# Patient Record
Sex: MALE | Race: WHITE | Employment: FULL TIME | URBAN - METROPOLITAN AREA
[De-identification: names, ages, dates, MRNs, and addresses within clinical notes are randomized per-mention and may not be internally consistent; named-entity substitution may affect disease eponyms.]

---

## 2018-04-21 VITALS
TEMPERATURE: 98.2 F | WEIGHT: 221 LBS | SYSTOLIC BLOOD PRESSURE: 140 MMHG | RESPIRATION RATE: 16 BRPM | HEART RATE: 120 BPM | DIASTOLIC BLOOD PRESSURE: 92 MMHG | BODY MASS INDEX: 31.64 KG/M2 | HEIGHT: 70 IN

## 2018-04-21 RX ORDER — TADALAFIL 5 MG/1
5 TABLET ORAL PRN
COMMUNITY
End: 2018-11-09 | Stop reason: SDUPTHER

## 2018-08-18 DIAGNOSIS — E29.1 HYPOTESTOSTERONEMIA IN MALE: Primary | ICD-10-CM

## 2018-08-20 DIAGNOSIS — E34.9 HYPOTESTOSTERONEMIA: Primary | ICD-10-CM

## 2018-08-20 RX ORDER — TESTOSTERONE 16.2 MG/G
GEL TRANSDERMAL
Refills: 2 | OUTPATIENT
Start: 2018-08-20 | End: 2018-09-20

## 2018-09-28 DIAGNOSIS — E34.9 HYPOTESTOSTERONEMIA: ICD-10-CM

## 2018-10-02 LAB
SEX HORMONE BINDING GLOBULIN: 40 NMOL/L (ref 11–80)
TESTOSTERONE FREE-NONMALE: 49.5 PG/ML (ref 47–244)
TESTOSTERONE TOTAL: 285 NG/DL (ref 220–1000)

## 2018-10-16 RX ORDER — BUPROPION HYDROCHLORIDE 150 MG/1
TABLET, EXTENDED RELEASE ORAL
Qty: 180 TABLET | Refills: 0 | OUTPATIENT
Start: 2018-10-16

## 2018-11-07 RX ORDER — TADALAFIL 5 MG/1
TABLET ORAL
Qty: 30 TABLET | Refills: 4 | OUTPATIENT
Start: 2018-11-07

## 2018-11-09 ENCOUNTER — OFFICE VISIT (OUTPATIENT)
Dept: PRIMARY CARE CLINIC | Age: 58
End: 2018-11-09
Payer: COMMERCIAL

## 2018-11-09 VITALS
BODY MASS INDEX: 31.92 KG/M2 | RESPIRATION RATE: 16 BRPM | HEART RATE: 90 BPM | HEIGHT: 70 IN | DIASTOLIC BLOOD PRESSURE: 88 MMHG | WEIGHT: 223 LBS | SYSTOLIC BLOOD PRESSURE: 144 MMHG | TEMPERATURE: 97.9 F

## 2018-11-09 DIAGNOSIS — E66.09 CLASS 1 OBESITY DUE TO EXCESS CALORIES WITH SERIOUS COMORBIDITY AND BODY MASS INDEX (BMI) OF 32.0 TO 32.9 IN ADULT: ICD-10-CM

## 2018-11-09 DIAGNOSIS — I10 ESSENTIAL HYPERTENSION: Primary | ICD-10-CM

## 2018-11-09 DIAGNOSIS — Z12.5 PROSTATE CANCER SCREENING: ICD-10-CM

## 2018-11-09 DIAGNOSIS — E29.1 HYPOTESTOSTERONEMIA IN MALE: ICD-10-CM

## 2018-11-09 DIAGNOSIS — N52.9 ERECTILE DYSFUNCTION, UNSPECIFIED ERECTILE DYSFUNCTION TYPE: ICD-10-CM

## 2018-11-09 DIAGNOSIS — F33.0 MILD EPISODE OF RECURRENT MAJOR DEPRESSIVE DISORDER (HCC): ICD-10-CM

## 2018-11-09 PROBLEM — N52.8 OTHER MALE ERECTILE DYSFUNCTION: Status: ACTIVE | Noted: 2018-11-09

## 2018-11-09 PROCEDURE — 99214 OFFICE O/P EST MOD 30 MIN: CPT | Performed by: NURSE PRACTITIONER

## 2018-11-09 RX ORDER — TADALAFIL 5 MG/1
5 TABLET ORAL PRN
Qty: 30 TABLET | Refills: 5 | Status: SHIPPED | OUTPATIENT
Start: 2018-11-09 | End: 2019-06-20 | Stop reason: SDUPTHER

## 2018-11-09 RX ORDER — TESTOSTERONE 16.2 MG/G
4 GEL TRANSDERMAL DAILY
Qty: 2 BOTTLE | Refills: 5 | Status: SHIPPED | OUTPATIENT
Start: 2018-11-09 | End: 2019-06-20 | Stop reason: SDUPTHER

## 2018-11-09 RX ORDER — TESTOSTERONE 12.5 MG/1.25G
5 GEL TOPICAL DAILY
Qty: 3 PACKAGE | Refills: 1 | Status: CANCELLED | OUTPATIENT
Start: 2018-11-09

## 2018-11-09 RX ORDER — BUPROPION HYDROCHLORIDE 150 MG/1
150 TABLET, EXTENDED RELEASE ORAL 2 TIMES DAILY
Qty: 180 TABLET | Refills: 1 | Status: SHIPPED | OUTPATIENT
Start: 2018-11-09 | End: 2019-06-20 | Stop reason: SDUPTHER

## 2018-11-09 RX ORDER — LISINOPRIL AND HYDROCHLOROTHIAZIDE 20; 12.5 MG/1; MG/1
1 TABLET ORAL DAILY
Qty: 90 TABLET | Refills: 1 | Status: SHIPPED | OUTPATIENT
Start: 2018-11-09 | End: 2019-06-20 | Stop reason: SDUPTHER

## 2018-11-09 RX ORDER — LISINOPRIL AND HYDROCHLOROTHIAZIDE 12.5; 1 MG/1; MG/1
1 TABLET ORAL DAILY
Qty: 90 TABLET | Refills: 1 | Status: CANCELLED | OUTPATIENT
Start: 2018-11-09

## 2018-11-09 ASSESSMENT — PATIENT HEALTH QUESTIONNAIRE - PHQ9
2. FEELING DOWN, DEPRESSED OR HOPELESS: 0
1. LITTLE INTEREST OR PLEASURE IN DOING THINGS: 0
SUM OF ALL RESPONSES TO PHQ QUESTIONS 1-9: 0
SUM OF ALL RESPONSES TO PHQ9 QUESTIONS 1 & 2: 0
SUM OF ALL RESPONSES TO PHQ QUESTIONS 1-9: 0

## 2018-11-09 ASSESSMENT — ENCOUNTER SYMPTOMS
ABDOMINAL DISTENTION: 0
TROUBLE SWALLOWING: 0
COUGH: 0
SHORTNESS OF BREATH: 0
NAUSEA: 0
ABDOMINAL PAIN: 0
DIARRHEA: 0
CONSTIPATION: 0
CHEST TIGHTNESS: 0

## 2018-11-09 NOTE — PROGRESS NOTES
Other Topics Concern    Not on file     Social History Narrative    No narrative on file     Family History   Problem Relation Age of Onset    Mental Illness Other      Patient Active Problem List   Diagnosis    Hypotestosteronemia    Essential hypertension    Class 1 obesity due to excess calories with serious comorbidity and body mass index (BMI) of 32.0 to 32.9 in adult    Mild episode of recurrent major depressive disorder ([facility] [address]    Other male erectile dysfunction        LABS:  Orders Only on 09/28/2018   Component Date Value Ref Range Status    Testosterone 09/28/2018 285  220 - 1,000 ng/dL Final    Sex Hormone Binding 09/28/2018 40  11 - 80 nmol/L Final    Testosterone, Free 09/28/2018 49.5  47 - 244 pg/mL Final    Comment:  The concentration of free testosterone is derived from a mathematical  expression based on the constant for the binding of testosterone to  albumin and/or sex hormone binding globulin. [address] [place] [address] [phone]            PHYSICAL EXAM  BP (!) 144/88 (Site: Right Upper Arm, Position: Sitting)   Pulse 90   Temp 97.9 °F (36.6 °C) (Oral)   Resp 16   Ht 5' 10\" (1.778 m)   Wt 223 lb (101.2 kg)   BMI 32.00 kg/m²     BP Readings from Last 3 Encounters:   11/09/18 (!) 144/88   04/13/18 (!) 140/92   09/27/16 (!) 138/98       Wt Readings from Last 3 Encounters:   11/09/18 223 lb (101.2 kg)   04/13/18 221 lb (100.2 kg)   09/27/16 222 lb (100.7 kg)        Physical Exam   Constitutional: He is oriented to person, place, and time. He appears well-developed and well-nourished. HENT:   Head: Normocephalic. Right Ear: External ear normal.   Left Ear: External ear normal.   Nose: Nose normal.   Mouth/Throat: Oropharynx is clear and moist.   Eyes: Pupils are equal, round, and reactive to light. Conjunctivae and EOM are normal.   Neck: Normal range of motion. Neck supple. Carotid bruit is not present. No thyromegaly present.    Cardiovascular:

## 2019-06-18 DIAGNOSIS — N52.9 ERECTILE DYSFUNCTION, UNSPECIFIED ERECTILE DYSFUNCTION TYPE: ICD-10-CM

## 2019-06-18 DIAGNOSIS — I10 ESSENTIAL HYPERTENSION: ICD-10-CM

## 2019-06-18 DIAGNOSIS — E29.1 HYPOTESTOSTERONEMIA IN MALE: ICD-10-CM

## 2019-06-18 DIAGNOSIS — Z12.5 PROSTATE CANCER SCREENING: ICD-10-CM

## 2019-06-18 LAB
A/G RATIO: 2.2 (ref 1.1–2.2)
ALBUMIN SERPL-MCNC: 4.6 G/DL (ref 3.4–5)
ALP BLD-CCNC: 58 U/L (ref 40–129)
ALT SERPL-CCNC: 20 U/L (ref 10–40)
ANION GAP SERPL CALCULATED.3IONS-SCNC: 12 MMOL/L (ref 3–16)
AST SERPL-CCNC: 15 U/L (ref 15–37)
BASOPHILS ABSOLUTE: 0 K/UL (ref 0–0.2)
BASOPHILS RELATIVE PERCENT: 0.5 %
BILIRUB SERPL-MCNC: 0.5 MG/DL (ref 0–1)
BUN BLDV-MCNC: 13 MG/DL (ref 7–20)
CALCIUM SERPL-MCNC: 9.7 MG/DL (ref 8.3–10.6)
CHLORIDE BLD-SCNC: 98 MMOL/L (ref 99–110)
CHOLESTEROL, FASTING: 182 MG/DL (ref 0–199)
CO2: 29 MMOL/L (ref 21–32)
CREAT SERPL-MCNC: 1.1 MG/DL (ref 0.9–1.3)
EOSINOPHILS ABSOLUTE: 0.1 K/UL (ref 0–0.6)
EOSINOPHILS RELATIVE PERCENT: 1.8 %
GFR AFRICAN AMERICAN: >60
GFR NON-AFRICAN AMERICAN: >60
GLOBULIN: 2.1 G/DL
GLUCOSE BLD-MCNC: 107 MG/DL (ref 70–99)
HCT VFR BLD CALC: 43.8 % (ref 40.5–52.5)
HDLC SERPL-MCNC: 63 MG/DL (ref 40–60)
HEMOGLOBIN: 14.7 G/DL (ref 13.5–17.5)
LDL CHOLESTEROL CALCULATED: 92 MG/DL
LYMPHOCYTES ABSOLUTE: 1.6 K/UL (ref 1–5.1)
LYMPHOCYTES RELATIVE PERCENT: 26 %
MCH RBC QN AUTO: 29.9 PG (ref 26–34)
MCHC RBC AUTO-ENTMCNC: 33.5 G/DL (ref 31–36)
MCV RBC AUTO: 89.3 FL (ref 80–100)
MONOCYTES ABSOLUTE: 0.5 K/UL (ref 0–1.3)
MONOCYTES RELATIVE PERCENT: 9 %
NEUTROPHILS ABSOLUTE: 3.8 K/UL (ref 1.7–7.7)
NEUTROPHILS RELATIVE PERCENT: 62.7 %
PDW BLD-RTO: 13.2 % (ref 12.4–15.4)
PLATELET # BLD: 298 K/UL (ref 135–450)
PMV BLD AUTO: 8.5 FL (ref 5–10.5)
POTASSIUM SERPL-SCNC: 4.6 MMOL/L (ref 3.5–5.1)
PROSTATE SPECIFIC ANTIGEN: 1.29 NG/ML (ref 0–4)
RBC # BLD: 4.9 M/UL (ref 4.2–5.9)
SODIUM BLD-SCNC: 139 MMOL/L (ref 136–145)
T4 FREE: 1.5 NG/DL (ref 0.9–1.8)
TOTAL PROTEIN: 6.7 G/DL (ref 6.4–8.2)
TRIGLYCERIDE, FASTING: 136 MG/DL (ref 0–150)
TSH SERPL DL<=0.05 MIU/L-ACNC: 1.46 UIU/ML (ref 0.27–4.2)
VLDLC SERPL CALC-MCNC: 27 MG/DL
WBC # BLD: 6 K/UL (ref 4–11)

## 2019-06-20 ENCOUNTER — OFFICE VISIT (OUTPATIENT)
Dept: PRIMARY CARE CLINIC | Age: 59
End: 2019-06-20
Payer: COMMERCIAL

## 2019-06-20 VITALS
BODY MASS INDEX: 31.85 KG/M2 | SYSTOLIC BLOOD PRESSURE: 134 MMHG | WEIGHT: 222 LBS | RESPIRATION RATE: 15 BRPM | OXYGEN SATURATION: 98 % | TEMPERATURE: 97.7 F | DIASTOLIC BLOOD PRESSURE: 78 MMHG | HEART RATE: 90 BPM

## 2019-06-20 DIAGNOSIS — D17.1 LIPOMA OF TORSO: ICD-10-CM

## 2019-06-20 DIAGNOSIS — I10 ESSENTIAL HYPERTENSION: Primary | ICD-10-CM

## 2019-06-20 DIAGNOSIS — E29.1 HYPOTESTOSTERONEMIA IN MALE: ICD-10-CM

## 2019-06-20 DIAGNOSIS — K43.9 VENTRAL HERNIA WITHOUT OBSTRUCTION OR GANGRENE: ICD-10-CM

## 2019-06-20 DIAGNOSIS — E66.09 CLASS 1 OBESITY DUE TO EXCESS CALORIES WITH SERIOUS COMORBIDITY AND BODY MASS INDEX (BMI) OF 32.0 TO 32.9 IN ADULT: ICD-10-CM

## 2019-06-20 DIAGNOSIS — R73.01 IFG (IMPAIRED FASTING GLUCOSE): ICD-10-CM

## 2019-06-20 DIAGNOSIS — N52.9 ERECTILE DYSFUNCTION, UNSPECIFIED ERECTILE DYSFUNCTION TYPE: ICD-10-CM

## 2019-06-20 DIAGNOSIS — F33.0 MILD EPISODE OF RECURRENT MAJOR DEPRESSIVE DISORDER (HCC): ICD-10-CM

## 2019-06-20 LAB
BILIRUBIN, POC: NORMAL
BLOOD URINE, POC: NORMAL
CLARITY, POC: CLEAR
COLOR, POC: YELLOW
GLUCOSE URINE, POC: NORMAL
KETONES, POC: NORMAL
LEUKOCYTE EST, POC: NORMAL
NITRITE, POC: NORMAL
PH, POC: 5.5
PROTEIN, POC: NORMAL
SEX HORMONE BINDING GLOBULIN: 50 NMOL/L (ref 11–80)
SPECIFIC GRAVITY, POC: 1.02
TESTOSTERONE FREE-NONMALE: 50.5 PG/ML (ref 47–244)
TESTOSTERONE TOTAL: 333 NG/DL (ref 220–1000)
UROBILINOGEN, POC: 0.2

## 2019-06-20 PROCEDURE — 81002 URINALYSIS NONAUTO W/O SCOPE: CPT | Performed by: NURSE PRACTITIONER

## 2019-06-20 PROCEDURE — 93000 ELECTROCARDIOGRAM COMPLETE: CPT | Performed by: NURSE PRACTITIONER

## 2019-06-20 PROCEDURE — 99214 OFFICE O/P EST MOD 30 MIN: CPT | Performed by: NURSE PRACTITIONER

## 2019-06-20 RX ORDER — LISINOPRIL AND HYDROCHLOROTHIAZIDE 20; 12.5 MG/1; MG/1
1 TABLET ORAL DAILY
Qty: 90 TABLET | Refills: 1 | Status: SHIPPED | OUTPATIENT
Start: 2019-06-20 | End: 2020-03-20 | Stop reason: SDUPTHER

## 2019-06-20 RX ORDER — BUPROPION HYDROCHLORIDE 150 MG/1
150 TABLET, EXTENDED RELEASE ORAL 2 TIMES DAILY
Qty: 180 TABLET | Refills: 1 | Status: CANCELLED | OUTPATIENT
Start: 2019-06-20

## 2019-06-20 RX ORDER — BUPROPION HYDROCHLORIDE 150 MG/1
300 TABLET ORAL EVERY MORNING
Qty: 180 TABLET | Refills: 1 | Status: SHIPPED | OUTPATIENT
Start: 2019-06-20 | End: 2020-03-20 | Stop reason: SDUPTHER

## 2019-06-20 RX ORDER — TESTOSTERONE 16.2 MG/G
4 GEL TRANSDERMAL DAILY
Qty: 2 BOTTLE | Refills: 5 | Status: SHIPPED | OUTPATIENT
Start: 2019-06-20 | End: 2020-03-20 | Stop reason: SDUPTHER

## 2019-06-20 RX ORDER — TADALAFIL 5 MG/1
5 TABLET ORAL PRN
Qty: 30 TABLET | Refills: 5 | Status: SHIPPED | OUTPATIENT
Start: 2019-06-20 | End: 2019-07-03 | Stop reason: SDUPTHER

## 2019-06-20 ASSESSMENT — ENCOUNTER SYMPTOMS
ABDOMINAL PAIN: 0
ABDOMINAL DISTENTION: 0
COUGH: 0
CHEST TIGHTNESS: 0
TROUBLE SWALLOWING: 0
SHORTNESS OF BREATH: 0
NAUSEA: 0
CONSTIPATION: 0
DIARRHEA: 0

## 2019-06-20 NOTE — PROGRESS NOTES
6/20/2019    Chief Complaint   Patient presents with    Hypertension     labs done 06/18/19, need refill, pt been checking b/p at home, aound 120//80    Hyperlipidemia     labs done 06/18/19, need refill.  Depression       HPI:  Suzi Jett is here for follow up with HTN, Depression, Hypotestosteronemia and Hyperlipidemia. He has been monitoring blood pressure at home and noting to be ranging 120-130's/80's. Denies chest pain, dizziness, headaches, fatigue, low libido or fatigue. He is concerned with area to left upper back which has been presnet for years but seems to be enlarging and can be uncomfortable when lying flat. He states that is noting bulging upper abdomen where hernia repair was performed and noting intermittent bruisng to abdomen after exercise, sit ups , push ups. No lifting weights. without trauma. Review of Systems   Constitutional: Negative for chills, fatigue and fever. HENT: Negative for trouble swallowing. Eyes: Negative for visual disturbance. Respiratory: Negative for cough, chest tightness and shortness of breath. Cardiovascular: Negative for chest pain, palpitations and leg swelling. Gastrointestinal: Negative for abdominal distention, abdominal pain, constipation, diarrhea and nausea. Endocrine: Negative for cold intolerance, heat intolerance, polydipsia, polyphagia and polyuria. Genitourinary: Negative for difficulty urinating, frequency and urgency. Musculoskeletal: Negative. Skin: Negative. Neurological: Negative for dizziness, weakness, light-headedness and headaches. Psychiatric/Behavioral: Negative for decreased concentration, dysphoric mood and sleep disturbance. The patient is not nervous/anxious. No Known Allergies  Prior to Visit Medications    Medication Sig Taking?  Authorizing Provider   tadalafil (CIALIS) 5 MG tablet Take 1 tablet by mouth as needed for Erectile Dysfunction Yes CT Garza - CNP   lisinopril-hydrochlorothiazide (PRINZIDE;ZESTORETIC) 20-12.5 MG per tablet Take 1 tablet by mouth daily Yes Kelly Hernandeze, APRN - CNP   Testosterone (ANDROGEL) 20.25 MG/ACT (1.62%) GEL gel Place 4 actuation onto the skin daily for 90 days. Yes Kelly Hernandeze, APRN - CNP   buPROPion (WELLBUTRIN XL) 150 MG extended release tablet Take 2 tablets by mouth every morning Yes Kelly Jennifer, APRN - CNP     Current Outpatient Medications   Medication Sig Dispense Refill    tadalafil (CIALIS) 5 MG tablet Take 1 tablet by mouth as needed for Erectile Dysfunction 30 tablet 5    lisinopril-hydrochlorothiazide (PRINZIDE;ZESTORETIC) 20-12.5 MG per tablet Take 1 tablet by mouth daily 90 tablet 1    Testosterone (ANDROGEL) 20.25 MG/ACT (1.62%) GEL gel Place 4 actuation onto the skin daily for 90 days. 2 Bottle 5    buPROPion (WELLBUTRIN XL) 150 MG extended release tablet Take 2 tablets by mouth every morning 180 tablet 1     No current facility-administered medications for this visit.       Health Maintenance   Topic Date Due    Hepatitis C screen  1960    A1C test (Diabetic or Prediabetic)  09/18/1969    HIV screen  09/18/1974    Shingles Vaccine (1 of 2) 09/18/2009    Colon cancer screen colonoscopy  09/18/2009    Flu vaccine (Season Ended) 09/01/2019    Potassium monitoring  06/18/2020    Creatinine monitoring  06/18/2020    DTaP/Tdap/Td vaccine (2 - Td) 06/27/2022    Lipid screen  06/18/2024    Pneumococcal 0-64 years Vaccine  Aged Out      Social History     Socioeconomic History    Marital status: Legally      Spouse name: None    Number of children: None    Years of education: None    Highest education level: None   Occupational History    None   Social Needs    Financial resource strain: None    Food insecurity:     Worry: None     Inability: None    Transportation needs:     Medical: None     Non-medical: None   Tobacco Use    Smoking status: Former Smoker     Packs/day: 1.00     Years: 15.00     Pack years: 15.00 Types: Cigarettes     Last attempt to quit: 1993     Years since quittin.6    Smokeless tobacco: Never Used   Substance and Sexual Activity    Alcohol use: Yes     Comment: very rare    Drug use: No    Sexual activity: None   Lifestyle    Physical activity:     Days per week: None     Minutes per session: None    Stress: None   Relationships    Social connections:     Talks on phone: None     Gets together: None     Attends Jain service: None     Active member of club or organization: None     Attends meetings of clubs or organizations: None     Relationship status: None    Intimate partner violence:     Fear of current or ex partner: None     Emotionally abused: None     Physically abused: None     Forced sexual activity: None   Other Topics Concern    None   Social History Narrative    None     Family History   Problem Relation Age of Onset    Mental Illness Other      Patient Active Problem List   Diagnosis    Hypotestosteronemia    Essential hypertension    Class 1 obesity due to excess calories with serious comorbidity and body mass index (BMI) of 32.0 to 32.9 in adult    Mild episode of recurrent major depressive disorder ([facility] [address]    Other male erectile dysfunction    IFG (impaired fasting glucose)        LABS:  Orders Only on 2019   Component Date Value Ref Range Status    PSA 2019 1.29  0.00 - 4.00 ng/mL Final    Sodium 2019 139  136 - 145 mmol/L Final    Potassium 2019 4.6  3.5 - 5.1 mmol/L Final    Chloride 2019 98* 99 - 110 mmol/L Final    CO2 2019 29  21 - 32 mmol/L Final    Anion Gap 2019 12  3 - 16 Final    Glucose 2019 107* 70 - 99 mg/dL Final    BUN 2019 13  7 - 20 mg/dL Final    CREATININE 2019 1.1  0.9 - 1.3 mg/dL Final    GFR Non- 2019 >60  >60 Final    Comment: >60 mL/min/1.73m2 EGFR, calc. for ages 25 and older using the  MDRD formula (not corrected for weight), is valid for stable  renal function.  GFR  06/18/2019 >60  >60 Final    Comment: Chronic Kidney Disease: less than 60 ml/min/1.73 sq.m. Kidney Failure: less than 15 ml/min/1.73 sq.m. Results valid for patients 18 years and older.       Calcium 06/18/2019 9.7  8.3 - 10.6 mg/dL Final    Total Protein 06/18/2019 6.7  6.4 - 8.2 g/dL Final    Alb 06/18/2019 4.6  3.4 - 5.0 g/dL Final    Albumin/Globulin Ratio 06/18/2019 2.2  1.1 - 2.2 Final    Total Bilirubin 06/18/2019 0.5  0.0 - 1.0 mg/dL Final    Alkaline Phosphatase 06/18/2019 58  40 - 129 U/L Final    ALT 06/18/2019 20  10 - 40 U/L Final    AST 06/18/2019 15  15 - 37 U/L Final    Globulin 06/18/2019 2.1  g/dL Final    WBC 06/18/2019 6.0  4.0 - 11.0 K/uL Final    RBC 06/18/2019 4.90  4.20 - 5.90 M/uL Final    Hemoglobin 06/18/2019 14.7  13.5 - 17.5 g/dL Final    Hematocrit 06/18/2019 43.8  40.5 - 52.5 % Final    MCV 06/18/2019 89.3  80.0 - 100.0 fL Final    MCH 06/18/2019 29.9  26.0 - 34.0 pg Final    MCHC 06/18/2019 33.5  31.0 - 36.0 g/dL Final    RDW 06/18/2019 13.2  12.4 - 15.4 % Final    Platelets 45/01/1197 298  135 - 450 K/uL Final    MPV 06/18/2019 8.5  5.0 - 10.5 fL Final    Neutrophils % 06/18/2019 62.7  % Final    Lymphocytes % 06/18/2019 26.0  % Final    Monocytes % 06/18/2019 9.0  % Final    Eosinophils % 06/18/2019 1.8  % Final    Basophils % 06/18/2019 0.5  % Final    Neutrophils # 06/18/2019 3.8  1.7 - 7.7 K/uL Final    Lymphocytes # 06/18/2019 1.6  1.0 - 5.1 K/uL Final    Monocytes # 06/18/2019 0.5  0.0 - 1.3 K/uL Final    Eosinophils # 06/18/2019 0.1  0.0 - 0.6 K/uL Final    Basophils # 06/18/2019 0.0  0.0 - 0.2 K/uL Final    Cholesterol, Fasting 06/18/2019 182  0 - 199 mg/dL Final    Triglyceride, Fasting 06/18/2019 136  0 - 150 mg/dL Final    HDL 06/18/2019 63* 40 - 60 mg/dL Final    LDL Calculated 06/18/2019 92  <100 mg/dL Final    VLDL Cholesterol Calculated 06/18/2019 27  Not Established mg/dL Final    T4 Free 06/18/2019 1.5  0.9 - 1.8 ng/dL Final    TSH 06/18/2019 1.46  0.27 - 4.20 uIU/mL Final          PHYSICAL EXAM  /78 (Site: Right Upper Arm, Position: Sitting, Cuff Size: Medium Adult)   Pulse 90   Temp 97.7 °F (36.5 °C) (Oral)   Resp 15   Wt 222 lb (100.7 kg)   SpO2 98%   BMI 31.85 kg/m²     BP Readings from Last 3 Encounters:   06/20/19 134/78   11/09/18 (!) 144/88   04/13/18 (!) 140/92       Wt Readings from Last 3 Encounters:   06/20/19 222 lb (100.7 kg)   11/09/18 223 lb (101.2 kg)   04/13/18 221 lb (100.2 kg)        Physical Exam   Constitutional: He is oriented to person, place, and time. He appears well-developed and well-nourished. HENT:   Head: Normocephalic. Right Ear: Tympanic membrane, external ear and ear canal normal.   Left Ear: Tympanic membrane, external ear and ear canal normal.   Nose: Nose normal.   Mouth/Throat: Uvula is midline, oropharynx is clear and moist and mucous membranes are normal.   Eyes: Pupils are equal, round, and reactive to light. Conjunctivae, EOM and lids are normal. No scleral icterus. Neck: Normal range of motion. Neck supple. Carotid bruit is not present. No thyromegaly present. Cardiovascular: Normal rate, regular rhythm, S1 normal, S2 normal, normal heart sounds and intact distal pulses. No murmur heard. Pulmonary/Chest: Effort normal and breath sounds normal. No respiratory distress. He has no wheezes. He has no rales. Abdominal: Soft. Normal appearance and bowel sounds are normal. He exhibits no distension and no mass. There is no hepatosplenomegaly. There is no tenderness. There is no rebound and no guarding. A hernia is present. Hernia confirmed positive in the ventral area. Genitourinary: Rectum normal, prostate normal and penis normal. Rectal exam shows guaiac negative stool. No penile tenderness. Musculoskeletal: Normal range of motion. He exhibits no edema, tenderness or deformity.    Lymphadenopathy:     He has

## 2019-07-03 DIAGNOSIS — N52.9 ERECTILE DYSFUNCTION, UNSPECIFIED ERECTILE DYSFUNCTION TYPE: ICD-10-CM

## 2019-07-03 RX ORDER — TADALAFIL 5 MG/1
TABLET ORAL
Qty: 30 TABLET | Refills: 0 | Status: SHIPPED | OUTPATIENT
Start: 2019-07-03 | End: 2019-08-06 | Stop reason: SDUPTHER

## 2019-08-06 DIAGNOSIS — N52.9 ERECTILE DYSFUNCTION, UNSPECIFIED ERECTILE DYSFUNCTION TYPE: ICD-10-CM

## 2019-08-06 RX ORDER — TADALAFIL 5 MG/1
TABLET ORAL
Qty: 30 TABLET | Refills: 0 | Status: SHIPPED | OUTPATIENT
Start: 2019-08-06 | End: 2019-09-02 | Stop reason: SDUPTHER

## 2019-08-09 ENCOUNTER — OFFICE VISIT (OUTPATIENT)
Dept: SURGERY | Age: 59
End: 2019-08-09
Payer: COMMERCIAL

## 2019-08-09 VITALS
DIASTOLIC BLOOD PRESSURE: 82 MMHG | SYSTOLIC BLOOD PRESSURE: 130 MMHG | HEIGHT: 70 IN | BODY MASS INDEX: 32.21 KG/M2 | WEIGHT: 225 LBS

## 2019-08-09 DIAGNOSIS — D17.1 LIPOMA OF TORSO: Primary | ICD-10-CM

## 2019-08-09 PROCEDURE — 99242 OFF/OP CONSLTJ NEW/EST SF 20: CPT | Performed by: SURGERY

## 2019-08-09 ASSESSMENT — ENCOUNTER SYMPTOMS
EYES NEGATIVE: 1
RESPIRATORY NEGATIVE: 1
ALLERGIC/IMMUNOLOGIC NEGATIVE: 1
ABDOMINAL PAIN: 1
NAUSEA: 1

## 2019-08-09 NOTE — PROGRESS NOTES
no edema. Neurological: He is alert and oriented to person, place, and time. Skin: Skin is warm and dry. Psychiatric: He has a normal mood and affect. His behavior is normal.   10 x 8 SQ mass of the R upper back    :      60year old male who presents for evaluation of a subcutaneous mass of the R upper back. The mass has been present for several years but has recently started to become tender. Physical examination reveals and approximately 10 X 8 cm subcutaneous mass of the R upper back which is consistent with a lipoma. Plan:      Excision of lipoma of the R upper back.

## 2019-08-20 NOTE — PROGRESS NOTES
makeup) or nail polish on your fingers or toes. 10. DO NOT wear any jewelry or piercings on day of surgery. All body piercing jewelry must be removed. 11. If you have ___dentures, they will be removed before going to the OR; we will provide you a container. If you wear ___contact lenses or ___glasses, they will be removed; please bring a case for them. 12. Please see your family doctor/pediatrician for a history & physical and/or concerning medications. Bring any test results/reports from your physician's office. PCP__________________Phone___________H&P Appt. Date________             13 If you  have a Living Will and Durable Power of  for Healthcare, please bring in a copy. 15. Notify your Surgeon if you develop any illness between now and surgery  time, cough, cold, fever, sore throat, nausea, vomiting, etc.  Please notify your surgeon if you experience dizziness, shortness of breath or blurred vision between now & the time of your surgery             15. DO NOT shave your operative site 96 hours prior to surgery. For face & neck surgery, men may use an electric razor 48 hours prior to surgery. 16. Shower the DAY OF surgery with _X__Antibacterial soap ___Hibiclens             17. To provide excellent care visitors will be limited to one in the room at any given time. 18.  Please bring picture ID and insurance card. 19.  Visit our web site for additional information:  [email or web address]              20.During flu season no children under the age of 15 are permitted in the hospital for the safety of all patients.                               21. If you take a long acting insulin in the evening only  take half of your usual  dose the night  before your procedure              22. If you use a c-pap please bring DOS if staying overnight,             23.For your convenience [facility] has a pharmacy on site to fill your

## 2019-08-22 RX ORDER — BUPRENORPHINE AND NALOXONE 8; 2 MG/1; MG/1
1 FILM, SOLUBLE BUCCAL; SUBLINGUAL DAILY
COMMUNITY
End: 2021-02-17 | Stop reason: ALTCHOICE

## 2019-08-23 ENCOUNTER — HOSPITAL ENCOUNTER (OUTPATIENT)
Age: 59
Setting detail: OUTPATIENT SURGERY
Discharge: HOME | End: 2019-08-23
Attending: SURGERY | Admitting: SURGERY
Payer: COMMERCIAL

## 2019-08-23 ENCOUNTER — ANESTHESIA EVENT (OUTPATIENT)
Dept: OPERATING ROOM | Age: 59
End: 2019-08-23
Payer: COMMERCIAL

## 2019-08-23 ENCOUNTER — ANESTHESIA (OUTPATIENT)
Dept: OPERATING ROOM | Age: 59
End: 2019-08-23
Payer: COMMERCIAL

## 2019-08-23 VITALS
HEART RATE: 92 BPM | WEIGHT: 225 LBS | OXYGEN SATURATION: 99 % | DIASTOLIC BLOOD PRESSURE: 79 MMHG | TEMPERATURE: 97.4 F | HEIGHT: 70 IN | RESPIRATION RATE: 18 BRPM | SYSTOLIC BLOOD PRESSURE: 130 MMHG | BODY MASS INDEX: 32.21 KG/M2

## 2019-08-23 VITALS — SYSTOLIC BLOOD PRESSURE: 106 MMHG | OXYGEN SATURATION: 98 % | DIASTOLIC BLOOD PRESSURE: 70 MMHG

## 2019-08-23 LAB
ANION GAP SERPL CALCULATED.3IONS-SCNC: 11 MMOL/L (ref 3–16)
BUN BLDV-MCNC: 14 MG/DL (ref 7–20)
CALCIUM SERPL-MCNC: 9.2 MG/DL (ref 8.3–10.6)
CHLORIDE BLD-SCNC: 101 MMOL/L (ref 99–110)
CO2: 29 MMOL/L (ref 21–32)
CREAT SERPL-MCNC: 1.1 MG/DL (ref 0.9–1.3)
GFR AFRICAN AMERICAN: >60
GFR NON-AFRICAN AMERICAN: >60
GLUCOSE BLD-MCNC: 92 MG/DL (ref 70–99)
POTASSIUM SERPL-SCNC: 4.2 MMOL/L (ref 3.5–5.1)
SODIUM BLD-SCNC: 141 MMOL/L (ref 136–145)

## 2019-08-23 PROCEDURE — 88304 TISSUE EXAM BY PATHOLOGIST: CPT

## 2019-08-23 PROCEDURE — 3600000012 HC SURGERY LEVEL 2 ADDTL 15MIN: Performed by: SURGERY

## 2019-08-23 PROCEDURE — 3700000001 HC ADD 15 MINUTES (ANESTHESIA): Performed by: SURGERY

## 2019-08-23 PROCEDURE — 2500000003 HC RX 250 WO HCPCS: Performed by: SURGERY

## 2019-08-23 PROCEDURE — 80048 BASIC METABOLIC PNL TOTAL CA: CPT

## 2019-08-23 PROCEDURE — 2580000003 HC RX 258: Performed by: SURGERY

## 2019-08-23 PROCEDURE — 2500000003 HC RX 250 WO HCPCS: Performed by: NURSE ANESTHETIST, CERTIFIED REGISTERED

## 2019-08-23 PROCEDURE — 3700000000 HC ANESTHESIA ATTENDED CARE: Performed by: SURGERY

## 2019-08-23 PROCEDURE — 21931 EXC BACK LES SC 3 CM/>: CPT | Performed by: SURGERY

## 2019-08-23 PROCEDURE — 7100000000 HC PACU RECOVERY - FIRST 15 MIN: Performed by: SURGERY

## 2019-08-23 PROCEDURE — 7100000010 HC PHASE II RECOVERY - FIRST 15 MIN: Performed by: SURGERY

## 2019-08-23 PROCEDURE — 6360000002 HC RX W HCPCS: Performed by: NURSE ANESTHETIST, CERTIFIED REGISTERED

## 2019-08-23 PROCEDURE — 7100000001 HC PACU RECOVERY - ADDTL 15 MIN: Performed by: SURGERY

## 2019-08-23 PROCEDURE — 2709999900 HC NON-CHARGEABLE SUPPLY: Performed by: SURGERY

## 2019-08-23 PROCEDURE — 36415 COLL VENOUS BLD VENIPUNCTURE: CPT

## 2019-08-23 PROCEDURE — 7100000011 HC PHASE II RECOVERY - ADDTL 15 MIN: Performed by: SURGERY

## 2019-08-23 PROCEDURE — 6360000002 HC RX W HCPCS

## 2019-08-23 PROCEDURE — 3600000002 HC SURGERY LEVEL 2 BASE: Performed by: SURGERY

## 2019-08-23 PROCEDURE — 2580000003 HC RX 258: Performed by: NURSE ANESTHETIST, CERTIFIED REGISTERED

## 2019-08-23 RX ORDER — MIDAZOLAM HYDROCHLORIDE 1 MG/ML
INJECTION INTRAMUSCULAR; INTRAVENOUS PRN
Status: DISCONTINUED | OUTPATIENT
Start: 2019-08-23 | End: 2019-08-23 | Stop reason: SDUPTHER

## 2019-08-23 RX ORDER — PROPOFOL 10 MG/ML
INJECTION, EMULSION INTRAVENOUS PRN
Status: DISCONTINUED | OUTPATIENT
Start: 2019-08-23 | End: 2019-08-23 | Stop reason: SDUPTHER

## 2019-08-23 RX ORDER — PROPOFOL 10 MG/ML
INJECTION, EMULSION INTRAVENOUS CONTINUOUS PRN
Status: DISCONTINUED | OUTPATIENT
Start: 2019-08-23 | End: 2019-08-23 | Stop reason: SDUPTHER

## 2019-08-23 RX ORDER — SODIUM CHLORIDE, SODIUM LACTATE, POTASSIUM CHLORIDE, CALCIUM CHLORIDE 600; 310; 30; 20 MG/100ML; MG/100ML; MG/100ML; MG/100ML
INJECTION, SOLUTION INTRAVENOUS CONTINUOUS PRN
Status: DISCONTINUED | OUTPATIENT
Start: 2019-08-23 | End: 2019-08-23 | Stop reason: SDUPTHER

## 2019-08-23 RX ORDER — CEFAZOLIN SODIUM 2 G/100ML
2 INJECTION, SOLUTION INTRAVENOUS ONCE
Status: COMPLETED | OUTPATIENT
Start: 2019-08-23 | End: 2019-08-23

## 2019-08-23 RX ORDER — KETAMINE HCL IN NACL, ISO-OSM 100MG/10ML
SYRINGE (ML) INJECTION PRN
Status: DISCONTINUED | OUTPATIENT
Start: 2019-08-23 | End: 2019-08-23 | Stop reason: SDUPTHER

## 2019-08-23 RX ORDER — LIDOCAINE HYDROCHLORIDE 10 MG/ML
INJECTION, SOLUTION INFILTRATION; PERINEURAL
Status: COMPLETED | OUTPATIENT
Start: 2019-08-23 | End: 2019-08-23

## 2019-08-23 RX ORDER — CEFAZOLIN SODIUM 2 G/100ML
INJECTION, SOLUTION INTRAVENOUS
Status: COMPLETED
Start: 2019-08-23 | End: 2019-08-23

## 2019-08-23 RX ORDER — ONDANSETRON 2 MG/ML
INJECTION INTRAMUSCULAR; INTRAVENOUS PRN
Status: DISCONTINUED | OUTPATIENT
Start: 2019-08-23 | End: 2019-08-23 | Stop reason: SDUPTHER

## 2019-08-23 RX ORDER — LIDOCAINE HYDROCHLORIDE 20 MG/ML
INJECTION, SOLUTION EPIDURAL; INFILTRATION; INTRACAUDAL; PERINEURAL PRN
Status: DISCONTINUED | OUTPATIENT
Start: 2019-08-23 | End: 2019-08-23 | Stop reason: SDUPTHER

## 2019-08-23 RX ORDER — MAGNESIUM HYDROXIDE 1200 MG/15ML
LIQUID ORAL CONTINUOUS PRN
Status: COMPLETED | OUTPATIENT
Start: 2019-08-23 | End: 2019-08-23

## 2019-08-23 RX ADMIN — ONDANSETRON 4 MG: 2 INJECTION INTRAMUSCULAR; INTRAVENOUS at 14:30

## 2019-08-23 RX ADMIN — Medication 30 MG: at 14:15

## 2019-08-23 RX ADMIN — CEFAZOLIN SODIUM 2 G: 2 INJECTION, SOLUTION INTRAVENOUS at 14:09

## 2019-08-23 RX ADMIN — PROPOFOL 120 MCG/KG/MIN: 10 INJECTION, EMULSION INTRAVENOUS at 14:15

## 2019-08-23 RX ADMIN — LIDOCAINE HYDROCHLORIDE 5 ML: 20 INJECTION, SOLUTION EPIDURAL; INFILTRATION; INTRACAUDAL; PERINEURAL at 14:15

## 2019-08-23 RX ADMIN — PROPOFOL 50 MG: 10 INJECTION, EMULSION INTRAVENOUS at 14:19

## 2019-08-23 RX ADMIN — MIDAZOLAM HYDROCHLORIDE 2 MG: 1 INJECTION, SOLUTION INTRAMUSCULAR; INTRAVENOUS at 14:08

## 2019-08-23 RX ADMIN — PROPOFOL 50 MG: 10 INJECTION, EMULSION INTRAVENOUS at 14:15

## 2019-08-23 RX ADMIN — SODIUM CHLORIDE, POTASSIUM CHLORIDE, SODIUM LACTATE AND CALCIUM CHLORIDE: 600; 310; 30; 20 INJECTION, SOLUTION INTRAVENOUS at 14:12

## 2019-08-23 ASSESSMENT — PULMONARY FUNCTION TESTS
PIF_VALUE: 0
PIF_VALUE: 1
PIF_VALUE: 0
PIF_VALUE: 1
PIF_VALUE: 0

## 2019-08-23 NOTE — H&P
Katt Brian     HPI: 60year old male with a lipoma of the back    Past Medical History:   Diagnosis Date    Back pain     Depression     Erectile dysfunction     Hypertension     IFG (impaired fasting glucose)     Kidney stone     Obesity        Past Surgical History:   Procedure Laterality Date    BACK SURGERY  2016    microlumber discectomy L5-S1    INGUINAL HERNIA REPAIR      all 3    KIDNEY STONE SURGERY Left     ATTEMPTED RETRIEVAL X3       Social History     Socioeconomic History    Marital status:      Spouse name: Not on file    Number of children: Not on file    Years of education: Not on file    Highest education level: Not on file   Occupational History    Not on file   Social Needs    Financial resource strain: Not on file    Food insecurity:     Worry: Not on file     Inability: Not on file    Transportation needs:     Medical: Not on file     Non-medical: Not on file   Tobacco Use    Smoking status: Former Smoker     Packs/day: 1.00     Years: 15.00     Pack years: 15.00     Types: Cigarettes     Last attempt to quit: 1993     Years since quittin.8    Smokeless tobacco: Never Used   Substance and Sexual Activity    Alcohol use: Yes     Comment: very rare    Drug use: No    Sexual activity: Not on file   Lifestyle    Physical activity:     Days per week: Not on file     Minutes per session: Not on file    Stress: Not on file   Relationships    Social connections:     Talks on phone: Not on file     Gets together: Not on file     Attends Bahai service: Not on file     Active member of club or organization: Not on file     Attends meetings of clubs or organizations: Not on file     Relationship status: Not on file    Intimate partner violence:     Fear of current or ex partner: Not on file     Emotionally abused: Not on file     Physically abused: Not on file     Forced sexual activity: Not on file   Other Topics Concern    Not on file

## 2019-08-23 NOTE — ANESTHESIA PRE PROCEDURE
Department of Anesthesiology  Preprocedure Note       Name:  Pia Brown   Age:  60 y.o.  :  1960                                          MRN:  [id number]         Date:  2019      Surgeon: Kenton Villareal):  Rm Guzman MD    Procedure: EXCISION LIPOMA ON BACK (N/A )    Medications prior to admission:   Prior to Admission medications    Medication Sig Start Date End Date Taking? Authorizing Provider   buprenorphine-naloxone (SUBOXONE) 8-2 MG FILM SL film Place 1 Film under the tongue daily. Yes Historical Provider, MD   lisinopril-hydrochlorothiazide (PRINZIDE;ZESTORETIC) 20-12.5 MG per tablet Take 1 tablet by mouth daily 19  Yes Billy Rueda APRN - CNP   buPROPion (WELLBUTRIN XL) 150 MG extended release tablet Take 2 tablets by mouth every morning  Patient taking differently: Take 150 mg by mouth 2 times daily  19  Yes Tanle Loots APRN - CNP   tadalafil (CIALIS) 5 MG tablet TAKE 1 TABLET BY MOUTH AS NEEDED FOR ERECTILE DYSFUNCTION 19   Sushil Moore MD   Testosterone (ANDROGEL) 20.25 MG/ACT (1.62%) GEL gel Place 4 actuation onto the skin daily for 90 days. 19  Jodeepa Rueda APRWOLF - CNP       Current medications:    No current facility-administered medications for this encounter.         Allergies:  No Known Allergies    Problem List:    Patient Active Problem List   Diagnosis Code    Hypotestosteronemia E34.9    Essential hypertension I10    Class 1 obesity due to excess calories with serious comorbidity and body mass index (BMI) of 32.0 to 32.9 in adult E66.09, Z68.32    Mild episode of recurrent major depressive disorder (HCC) F33.0    Other male erectile dysfunction N52.8    IFG (impaired fasting glucose) R73.01       Past Medical History:        Diagnosis Date    Back pain     Depression     Erectile dysfunction     Hypertension     IFG (impaired fasting glucose)     Kidney stone     Obesity        Past Surgical History:        Procedure

## 2019-08-24 NOTE — OP NOTE
[facility]                     [address] [facility], [address] [facility]                                OPERATIVE REPORT    PATIENT NAME: Jodi Samayoa                     :        1960  MED REC NO:   [id number]                          ROOM:  ACCOUNT NO:   [de-identified]                           ADMIT DATE: 2019  PROVIDER:     Scarlett Lund MD    DATE OF PROCEDURE:  2019    PREOPERATIVE DIAGNOSIS:  Lipoma of the right upper back. POSTOPERATIVE DIAGNOSIS:  Lipoma of the right upper back. PROCEDURE:  Excision of 8 cm lipoma of the right upper back. SURGEON:  Scarlett Lund MD    ANESTHESIA:  MAC with local.    OPERATIVE INDICATIONS:  The patient is a 67-year-old male with a tender  lipoma of the right upper back. He is brought in today for excision of  the lipoma. DETAILS OF THE PROCEDURE:  The patient was brought to the operating  suite and placed in the left lateral decubitus position. After MAC, he  was prepped and draped in usual sterile fashion. The skin and  subcutaneous tissue was injected with 1% lidocaine. We then made a 5-cm  transverse incision on the right upper back. Dissection was carried  down through the subcutaneous tissue. A capsule was encountered. The  capsule was opened. Its contents were removed. Its contents were  consistent with an 8-cm lipoma. All the lipoma was removed with  excellent hemostasis. The subcutaneous layer was closed with  interrupted 3-0 Vicryl sutures followed by running 4-0 subcuticular  suture in the skin. Dermabond mesh was then applied. The patient  tolerated the procedure without difficulty and transferred to recovery  room in stable condition.         Mae Tapia MD    D: 2019 15:52:10       T: 2019 16:02:28     [email or web address]  Job#: [id number]     Doc#: [id number]    CC:  Moe Smith, LINDA

## 2019-08-26 NOTE — COMMUNICATION BODY
Assessment:  60year old male who presents for evaluation of a subcutaneous mass of the R upper back. The mass has been present for several years but has recently started to become tender. Physical examination reveals and approximately 10 X 8 cm subcutaneous mass of the R upper back which is consistent with a lipoma. Plan:  Excision of lipoma of the R upper back.

## 2019-09-02 DIAGNOSIS — N52.9 ERECTILE DYSFUNCTION, UNSPECIFIED ERECTILE DYSFUNCTION TYPE: ICD-10-CM

## 2019-09-03 RX ORDER — TADALAFIL 5 MG/1
TABLET ORAL
Qty: 30 TABLET | Refills: 0 | Status: SHIPPED | OUTPATIENT
Start: 2019-09-03 | End: 2019-10-07 | Stop reason: SDUPTHER

## 2019-10-07 DIAGNOSIS — N52.9 ERECTILE DYSFUNCTION, UNSPECIFIED ERECTILE DYSFUNCTION TYPE: ICD-10-CM

## 2019-10-07 RX ORDER — TADALAFIL 5 MG/1
TABLET ORAL
Qty: 30 TABLET | Refills: 0 | Status: SHIPPED | OUTPATIENT
Start: 2019-10-07 | End: 2020-03-20 | Stop reason: ALTCHOICE

## 2020-03-20 ENCOUNTER — OFFICE VISIT (OUTPATIENT)
Dept: PRIMARY CARE CLINIC | Age: 60
End: 2020-03-20
Payer: COMMERCIAL

## 2020-03-20 VITALS
OXYGEN SATURATION: 99 % | TEMPERATURE: 98.5 F | WEIGHT: 235.6 LBS | DIASTOLIC BLOOD PRESSURE: 94 MMHG | SYSTOLIC BLOOD PRESSURE: 146 MMHG | BODY MASS INDEX: 33.81 KG/M2 | HEART RATE: 76 BPM

## 2020-03-20 PROCEDURE — 99214 OFFICE O/P EST MOD 30 MIN: CPT | Performed by: INTERNAL MEDICINE

## 2020-03-20 RX ORDER — TADALAFIL 20 MG/1
20 TABLET ORAL DAILY PRN
Qty: 30 TABLET | Refills: 5 | Status: SHIPPED | OUTPATIENT
Start: 2020-03-20 | End: 2021-02-17 | Stop reason: SDUPTHER

## 2020-03-20 RX ORDER — BUPROPION HYDROCHLORIDE 150 MG/1
150 TABLET ORAL 2 TIMES DAILY
Qty: 180 TABLET | Refills: 1 | Status: SHIPPED | OUTPATIENT
Start: 2020-03-20 | End: 2020-09-30 | Stop reason: SDUPTHER

## 2020-03-20 RX ORDER — TADALAFIL 20 MG/1
20 TABLET ORAL DAILY PRN
Qty: 30 TABLET | Refills: 5 | Status: SHIPPED | OUTPATIENT
Start: 2020-03-20 | End: 2020-03-20 | Stop reason: SDUPTHER

## 2020-03-20 RX ORDER — LISINOPRIL AND HYDROCHLOROTHIAZIDE 20; 12.5 MG/1; MG/1
1 TABLET ORAL DAILY
Qty: 90 TABLET | Refills: 1 | Status: SHIPPED | OUTPATIENT
Start: 2020-03-20 | End: 2020-09-29 | Stop reason: SDUPTHER

## 2020-03-20 RX ORDER — PENICILLIN V POTASSIUM 500 MG/1
500 TABLET ORAL 4 TIMES DAILY
Qty: 40 TABLET | Refills: 0 | Status: SHIPPED | OUTPATIENT
Start: 2020-03-20 | End: 2020-03-30

## 2020-03-20 RX ORDER — TESTOSTERONE 16.2 MG/G
4 GEL TRANSDERMAL DAILY
Qty: 2 BOTTLE | Refills: 5 | Status: SHIPPED | OUTPATIENT
Start: 2020-03-20 | End: 2021-02-17 | Stop reason: ALTCHOICE

## 2020-03-20 RX ORDER — TADALAFIL 5 MG/1
TABLET ORAL
Qty: 30 TABLET | Refills: 5 | Status: CANCELLED | OUTPATIENT
Start: 2020-03-20

## 2020-03-20 ASSESSMENT — ENCOUNTER SYMPTOMS
ABDOMINAL PAIN: 0
BACK PAIN: 0
ABDOMINAL DISTENTION: 0
COUGH: 0
SHORTNESS OF BREATH: 0
NAUSEA: 0
CHEST TIGHTNESS: 0
DIARRHEA: 0

## 2020-03-20 NOTE — PROGRESS NOTES
OBJECTIVE:  BP (!) 146/94 (Site: Left Upper Arm, Position: Sitting, Cuff Size: Medium Adult)   Pulse 76   Temp 98.5 °F (36.9 °C) (Tympanic)   Wt 235 lb 9.6 oz (106.9 kg)   SpO2 99%   BMI 33.81 kg/m²    Physical Exam  Vitals signs and nursing note reviewed. Constitutional:       General: He is not in acute distress. Appearance: Normal appearance. He is well-developed. HENT:      Head: Normocephalic and atraumatic. Right Ear: Tympanic membrane, ear canal and external ear normal.      Left Ear: Tympanic membrane, ear canal and external ear normal.      Nose: Nose normal. No rhinorrhea. Mouth/Throat:      Pharynx: No oropharyngeal exudate or posterior oropharyngeal erythema. Eyes:      General:         Right eye: No discharge. Left eye: No discharge. Extraocular Movements: Extraocular movements intact. Conjunctiva/sclera: Conjunctivae normal.      Pupils: Pupils are equal, round, and reactive to light. Neck:      Musculoskeletal: Normal range of motion. No muscular tenderness. Thyroid: No thyromegaly. Vascular: No carotid bruit or JVD. Cardiovascular:      Rate and Rhythm: Normal rate and regular rhythm. Pulses: Normal pulses. Heart sounds: Normal heart sounds. No murmur. Pulmonary:      Effort: Pulmonary effort is normal. No respiratory distress. Breath sounds: Normal breath sounds. No wheezing, rhonchi or rales. Abdominal:      General: Bowel sounds are normal. There is no distension. Palpations: Abdomen is soft. Tenderness: There is no abdominal tenderness. There is no rebound. Musculoskeletal:         General: No swelling. Right lower leg: No edema. Left lower leg: No edema. Comments: FROM x 4   Lymphadenopathy:      Cervical: No cervical adenopathy. Skin:     General: Skin is warm and dry. Capillary Refill: Capillary refill takes 2 to 3 seconds. Coloration: Skin is not pale.       Findings: No erythema or rash. Neurological:      Mental Status: He is alert and oriented to person, place, and time. Cranial Nerves: No cranial nerve deficit. Sensory: No sensory deficit. Motor: No abnormal muscle tone. Deep Tendon Reflexes: Reflexes normal.   Psychiatric:         Mood and Affect: Mood normal.         Behavior: Behavior normal.         Thought Content: Thought content normal.         Judgment: Judgment normal.         Data Review:   CBC:   Lab Results   Component Value Date    WBC 6.0 06/18/2019    HGB 14.7 06/18/2019    HCT 43.8 06/18/2019    MCV 89.3 06/18/2019     06/18/2019     Chemistry:   Lab Results   Component Value Date     08/23/2019     06/18/2019     07/01/2016    K 4.2 08/23/2019    K 4.6 06/18/2019    K 4.6 07/01/2016     08/23/2019    CL 98 06/18/2019    CL 98 07/01/2016    CO2 29 08/23/2019    CO2 29 06/18/2019    CO2 29 07/01/2016    BUN 14 08/23/2019    BUN 13 06/18/2019    BUN 19 07/01/2016    CREATININE 1.1 08/23/2019    CREATININE 1.1 06/18/2019    CREATININE 1.1 07/01/2016     Hepatic Function:   Lab Results   Component Value Date    AST 15 06/18/2019    ALT 20 06/18/2019    BILITOT 0.5 06/18/2019    ALKPHOS 58 06/18/2019     No results found for: LIPASE, AMYLASE  Lipids:   Lab Results   Component Value Date    HDL 63 06/18/2019       ASSESSMENT/PLAN  1. Hypotestosteronemia in male  INS always argues about it  - Testosterone (ANDROGEL) 20.25 MG/ACT (1.62%) GEL gel; Place 4 actuation onto the skin daily for 90 days. Dispense: 2 Bottle; Refill: 5  Urology referral to [facility] re: possible testosterone injection    2. Erectile dysfunction, unspecified erectile dysfunction type  Cialis 20 mg #30 GoodRx refilled  - tadalafil (CIALIS) 5 MG tablet; TAKE 1 TABLET BY MOUTH AS NEEDED FOR ERECTILE DYSFUNCTION  Dispense: 30 tablet;  Refill: 0  I GET IT EVERY DAY I BEEN ON IT DAILY EVERY DAY  I CANT AFFORD TO PAY FOR IT  HE WOULD WRITE IT A CERTAIN

## 2020-06-10 ENCOUNTER — TELEPHONE (OUTPATIENT)
Dept: PRIMARY CARE CLINIC | Age: 60
End: 2020-06-10

## 2020-06-10 ENCOUNTER — HOSPITAL ENCOUNTER (OUTPATIENT)
Age: 60
Discharge: HOME | End: 2020-06-10
Payer: COMMERCIAL

## 2020-06-10 LAB
A/G RATIO: 1.7 (ref 1.1–2.2)
ALBUMIN SERPL-MCNC: 4.5 G/DL (ref 3.4–5)
ALP BLD-CCNC: 57 U/L (ref 40–129)
ALT SERPL-CCNC: 19 U/L (ref 10–40)
ANION GAP SERPL CALCULATED.3IONS-SCNC: 13 MMOL/L (ref 3–16)
AST SERPL-CCNC: 22 U/L (ref 15–37)
BASOPHILS ABSOLUTE: 0 K/UL (ref 0–0.2)
BASOPHILS RELATIVE PERCENT: 0.9 %
BILIRUB SERPL-MCNC: 0.8 MG/DL (ref 0–1)
BUN BLDV-MCNC: 20 MG/DL (ref 7–20)
CALCIUM SERPL-MCNC: 9.7 MG/DL (ref 8.3–10.6)
CHLORIDE BLD-SCNC: 97 MMOL/L (ref 99–110)
CHOLESTEROL, FASTING: 165 MG/DL (ref 0–199)
CO2: 27 MMOL/L (ref 21–32)
CREAT SERPL-MCNC: 1.3 MG/DL (ref 0.8–1.3)
EOSINOPHILS ABSOLUTE: 0 K/UL (ref 0–0.6)
EOSINOPHILS RELATIVE PERCENT: 0.7 %
GFR AFRICAN AMERICAN: >60
GFR NON-AFRICAN AMERICAN: 56
GLOBULIN: 2.7 G/DL
GLUCOSE FASTING: 99 MG/DL (ref 70–99)
HCT VFR BLD CALC: 42.8 % (ref 40.5–52.5)
HDLC SERPL-MCNC: 53 MG/DL (ref 40–60)
HEMOGLOBIN: 14.7 G/DL (ref 13.5–17.5)
LDL CHOLESTEROL CALCULATED: 99 MG/DL
LYMPHOCYTES ABSOLUTE: 1.7 K/UL (ref 1–5.1)
LYMPHOCYTES RELATIVE PERCENT: 30.9 %
MCH RBC QN AUTO: 30.5 PG (ref 26–34)
MCHC RBC AUTO-ENTMCNC: 34.3 G/DL (ref 31–36)
MCV RBC AUTO: 88.9 FL (ref 80–100)
MONOCYTES ABSOLUTE: 0.6 K/UL (ref 0–1.3)
MONOCYTES RELATIVE PERCENT: 10.4 %
NEUTROPHILS ABSOLUTE: 3.1 K/UL (ref 1.7–7.7)
NEUTROPHILS RELATIVE PERCENT: 57.1 %
PDW BLD-RTO: 12.9 % (ref 12.4–15.4)
PLATELET # BLD: 278 K/UL (ref 135–450)
PMV BLD AUTO: 8.6 FL (ref 5–10.5)
POTASSIUM SERPL-SCNC: 4.4 MMOL/L (ref 3.5–5.1)
PROSTATE SPECIFIC ANTIGEN: 1.63 NG/ML (ref 0–4)
RBC # BLD: 4.82 M/UL (ref 4.2–5.9)
SODIUM BLD-SCNC: 137 MMOL/L (ref 136–145)
TOTAL PROTEIN: 7.2 G/DL (ref 6.4–8.2)
TRIGLYCERIDE, FASTING: 67 MG/DL (ref 0–150)
TSH SERPL DL<=0.05 MIU/L-ACNC: 0.84 UIU/ML (ref 0.27–4.2)
VLDLC SERPL CALC-MCNC: 13 MG/DL
WBC # BLD: 5.4 K/UL (ref 4–11)

## 2020-06-10 PROCEDURE — 84153 ASSAY OF PSA TOTAL: CPT

## 2020-06-10 PROCEDURE — 84443 ASSAY THYROID STIM HORMONE: CPT

## 2020-06-10 PROCEDURE — 36415 COLL VENOUS BLD VENIPUNCTURE: CPT

## 2020-06-10 PROCEDURE — 85025 COMPLETE CBC W/AUTO DIFF WBC: CPT

## 2020-06-10 PROCEDURE — 84403 ASSAY OF TOTAL TESTOSTERONE: CPT

## 2020-06-10 PROCEDURE — 80053 COMPREHEN METABOLIC PANEL: CPT

## 2020-06-10 PROCEDURE — 83036 HEMOGLOBIN GLYCOSYLATED A1C: CPT

## 2020-06-10 PROCEDURE — 80061 LIPID PANEL: CPT

## 2020-06-11 LAB
ESTIMATED AVERAGE GLUCOSE: 102.5 MG/DL
HBA1C MFR BLD: 5.2 %

## 2020-06-12 LAB — TESTOSTERONE TOTAL: 383 NG/DL (ref 220–1000)

## 2020-09-29 RX ORDER — LISINOPRIL AND HYDROCHLOROTHIAZIDE 20; 12.5 MG/1; MG/1
1 TABLET ORAL DAILY
Qty: 90 TABLET | Refills: 1 | Status: SHIPPED | OUTPATIENT
Start: 2020-09-29 | End: 2021-02-17 | Stop reason: SINTOL

## 2020-09-30 RX ORDER — BUPROPION HYDROCHLORIDE 150 MG/1
150 TABLET ORAL 2 TIMES DAILY
Qty: 180 TABLET | Refills: 1 | Status: SHIPPED | OUTPATIENT
Start: 2020-09-30 | End: 2021-02-17 | Stop reason: SDUPTHER

## 2021-02-17 ENCOUNTER — OFFICE VISIT (OUTPATIENT)
Dept: PRIMARY CARE CLINIC | Age: 61
End: 2021-02-17
Payer: COMMERCIAL

## 2021-02-17 VITALS
OXYGEN SATURATION: 97 % | DIASTOLIC BLOOD PRESSURE: 92 MMHG | SYSTOLIC BLOOD PRESSURE: 140 MMHG | BODY MASS INDEX: 33.78 KG/M2 | WEIGHT: 235.4 LBS | HEART RATE: 80 BPM | TEMPERATURE: 97.1 F

## 2021-02-17 DIAGNOSIS — F33.0 MILD EPISODE OF RECURRENT MAJOR DEPRESSIVE DISORDER (HCC): ICD-10-CM

## 2021-02-17 DIAGNOSIS — I10 ESSENTIAL HYPERTENSION: ICD-10-CM

## 2021-02-17 DIAGNOSIS — R05.9 COUGH: ICD-10-CM

## 2021-02-17 DIAGNOSIS — Z12.11 COLON CANCER SCREENING: ICD-10-CM

## 2021-02-17 DIAGNOSIS — Z00.00 WELL ADULT EXAM: Primary | ICD-10-CM

## 2021-02-17 DIAGNOSIS — N52.9 ERECTILE DYSFUNCTION, UNSPECIFIED ERECTILE DYSFUNCTION TYPE: ICD-10-CM

## 2021-02-17 PROCEDURE — 99213 OFFICE O/P EST LOW 20 MIN: CPT | Performed by: INTERNAL MEDICINE

## 2021-02-17 RX ORDER — TADALAFIL 20 MG/1
20 TABLET ORAL DAILY PRN
Qty: 30 TABLET | Refills: 5 | Status: SHIPPED | OUTPATIENT
Start: 2021-02-17

## 2021-02-17 RX ORDER — HYDROCHLOROTHIAZIDE 25 MG/1
25 TABLET ORAL EVERY MORNING
Qty: 30 TABLET | Refills: 5 | Status: SHIPPED | OUTPATIENT
Start: 2021-02-17

## 2021-02-17 RX ORDER — BENZONATATE 100 MG/1
100 CAPSULE ORAL 3 TIMES DAILY PRN
Qty: 30 CAPSULE | Refills: 0 | Status: SHIPPED | OUTPATIENT
Start: 2021-02-17 | End: 2021-02-24

## 2021-02-17 RX ORDER — AMLODIPINE BESYLATE 5 MG/1
5 TABLET ORAL DAILY
Qty: 30 TABLET | Refills: 5 | Status: SHIPPED | OUTPATIENT
Start: 2021-02-17

## 2021-02-17 RX ORDER — BUPROPION HYDROCHLORIDE 150 MG/1
150 TABLET ORAL 2 TIMES DAILY
Qty: 180 TABLET | Refills: 1 | Status: SHIPPED | OUTPATIENT
Start: 2021-02-17

## 2021-02-17 RX ORDER — LISINOPRIL AND HYDROCHLOROTHIAZIDE 20; 12.5 MG/1; MG/1
1 TABLET ORAL DAILY
Qty: 90 TABLET | Refills: 1 | Status: CANCELLED | OUTPATIENT
Start: 2021-02-17

## 2021-02-17 RX ORDER — BUPROPION HYDROCHLORIDE 150 MG/1
150 TABLET ORAL 2 TIMES DAILY
Qty: 180 TABLET | Refills: 1 | Status: CANCELLED | OUTPATIENT
Start: 2021-02-17

## 2021-02-17 ASSESSMENT — ENCOUNTER SYMPTOMS
COUGH: 0
CHEST TIGHTNESS: 0
ABDOMINAL PAIN: 0
DIARRHEA: 0
SHORTNESS OF BREATH: 0
ABDOMINAL DISTENTION: 0
BACK PAIN: 0
NAUSEA: 0

## 2021-02-17 NOTE — PROGRESS NOTES
2/17/2021   Dottie Eaton  1960    The patients PMH, surgical history, family history, medications, allergies were all reviewed and updated as appropriate today. Current Outpatient Medications on File Prior to Visit   Medication Sig Dispense Refill    buPROPion (WELLBUTRIN XL) 150 MG extended release tablet Take 1 tablet by mouth 2 times daily 180 tablet 1    lisinopril-hydroCHLOROthiazide (PRINZIDE;ZESTORETIC) 20-12.5 MG per tablet Take 1 tablet by mouth daily 90 tablet 1    tadalafil (CIALIS) 20 MG tablet Take 1 tablet by mouth daily as needed for Erectile Dysfunction 30 tablet 5     No current facility-administered medications on file prior to visit. Chief Complaint   Patient presents with    Cough     x a few months        HPI:  C/o persistent cough for months, COVID negative, on ACEi for years, non-smoker  Wants labs done today so will check COVID dania    Wants refills on 3 meds today    Review of Systems   Constitutional: Negative for appetite change, chills, fatigue and fever. Respiratory: Negative for cough, chest tightness and shortness of breath. Cardiovascular: Negative for chest pain. Gastrointestinal: Negative for abdominal distention, abdominal pain, diarrhea and nausea. Genitourinary: Negative for difficulty urinating and dysuria. Musculoskeletal: Negative for back pain. Neurological: Negative for dizziness and headaches. Psychiatric/Behavioral: Negative for agitation and behavioral problems. The patient is not nervous/anxious. OBJECTIVE:  BP (!) 140/92 (Site: Right Upper Arm, Position: Sitting, Cuff Size: Large Adult)   Pulse 80   Temp 97.1 °F (36.2 °C) (Infrared)   Wt 235 lb 6.4 oz (106.8 kg)   SpO2 97%   BMI 33.78 kg/m²    Physical Exam  Vitals signs and nursing note reviewed. Constitutional:       General: He is not in acute distress. Appearance: Normal appearance. He is well-developed. HENT:      Head: Normocephalic and atraumatic. Right Ear: Tympanic membrane, ear canal and external ear normal.      Left Ear: Tympanic membrane, ear canal and external ear normal.      Nose: Nose normal. No rhinorrhea. Mouth/Throat:      Pharynx: No oropharyngeal exudate or posterior oropharyngeal erythema. Eyes:      General:         Right eye: No discharge. Left eye: No discharge. Extraocular Movements: Extraocular movements intact. Conjunctiva/sclera: Conjunctivae normal.      Pupils: Pupils are equal, round, and reactive to light. Neck:      Musculoskeletal: Normal range of motion. No muscular tenderness. Thyroid: No thyromegaly. Vascular: No carotid bruit or JVD. Cardiovascular:      Rate and Rhythm: Normal rate and regular rhythm. Pulses: Normal pulses. Heart sounds: Normal heart sounds. No murmur. Pulmonary:      Effort: Pulmonary effort is normal. No respiratory distress. Breath sounds: Normal breath sounds. No wheezing, rhonchi or rales. Abdominal:      General: Bowel sounds are normal. There is no distension. Palpations: Abdomen is soft. Tenderness: There is no abdominal tenderness. There is no rebound. Musculoskeletal:         General: No swelling. Right lower leg: No edema. Left lower leg: No edema. Comments: FROM x 4   Lymphadenopathy:      Cervical: No cervical adenopathy. Skin:     General: Skin is warm and dry. Capillary Refill: Capillary refill takes 2 to 3 seconds. Coloration: Skin is not pale. Findings: No erythema or rash. Neurological:      Mental Status: He is alert and oriented to person, place, and time. Cranial Nerves: No cranial nerve deficit. Sensory: No sensory deficit. Motor: No abnormal muscle tone. Deep Tendon Reflexes: Reflexes normal.   Psychiatric:         Mood and Affect: Mood normal.         Behavior: Behavior normal.         Thought Content:  Thought content normal.         Judgment: Judgment normal. Data Review:   CBC:   Lab Results   Component Value Date    WBC 5.4 06/10/2020    WBC 6.0 06/18/2019    HGB 14.7 06/10/2020    HGB 14.7 06/18/2019    HCT 42.8 06/10/2020    HCT 43.8 06/18/2019    MCV 88.9 06/10/2020    MCV 89.3 06/18/2019     06/10/2020     06/18/2019     Chemistry:   Lab Results   Component Value Date     06/10/2020     08/23/2019     06/18/2019    K 4.4 06/10/2020    K 4.2 08/23/2019    K 4.6 06/18/2019    CL 97 06/10/2020     08/23/2019    CL 98 06/18/2019    CO2 27 06/10/2020    CO2 29 08/23/2019    CO2 29 06/18/2019    BUN 20 06/10/2020    BUN 14 08/23/2019    BUN 13 06/18/2019    CREATININE 1.3 06/10/2020    CREATININE 1.1 08/23/2019    CREATININE 1.1 06/18/2019     Hepatic Function:   Lab Results   Component Value Date    AST 22 06/10/2020    AST 15 06/18/2019    ALT 19 06/10/2020    ALT 20 06/18/2019    BILITOT 0.8 06/10/2020    BILITOT 0.5 06/18/2019    ALKPHOS 57 06/10/2020    ALKPHOS 58 06/18/2019     No results found for: LIPASE, AMYLASE  Lipids:   Lab Results   Component Value Date    HDL 53 06/10/2020       ASSESSMENT/PLAN  1. Mild episode of recurrent major depressive disorder (HCC)  Refills OK'd  - buPROPion (WELLBUTRIN XL) 150 MG extended release tablet; Take 1 tablet by mouth 2 times daily  Dispense: 180 tablet; Refill: 1    2. Essential hypertension  Change to non-ACE due to cough  Fasting labs ordered since last done 6/2020     3.) Cough- stop Prinzide  On HCTZ and amlodipine  Pt wants Tessalon perles prescribed for cough today    4.) used to take Suboxone. .  F/u in 1 month  To see if Pulmonary referral needed    Consider Shingrix vaccination series of 2 shots over 2-6 months if desired for protection from shingles-check with [employer] first re: coverage before beginning series     colonoscopy advised    Samson Romero      Electronically signed by Samson Romero MD on 2/17/2021 at 8:33 AM

## 2021-07-15 ENCOUNTER — HOSPITAL ENCOUNTER (EMERGENCY)
Age: 61
Discharge: HOME | End: 2021-07-16
Attending: EMERGENCY MEDICINE

## 2021-07-15 DIAGNOSIS — F19.10 POLYSUBSTANCE ABUSE (HCC): Primary | ICD-10-CM

## 2021-07-15 PROCEDURE — 99285 EMERGENCY DEPT VISIT HI MDM: CPT

## 2021-07-16 ENCOUNTER — APPOINTMENT (OUTPATIENT)
Dept: GENERAL RADIOLOGY | Age: 61
End: 2021-07-16

## 2021-07-16 VITALS
RESPIRATION RATE: 12 BRPM | OXYGEN SATURATION: 98 % | HEIGHT: 70 IN | DIASTOLIC BLOOD PRESSURE: 84 MMHG | HEART RATE: 92 BPM | SYSTOLIC BLOOD PRESSURE: 143 MMHG | BODY MASS INDEX: 32.1 KG/M2 | TEMPERATURE: 97.8 F | WEIGHT: 224.21 LBS

## 2021-07-16 LAB
A/G RATIO: 1.4 (ref 1.1–2.2)
A/G RATIO: 1.5 (ref 1.1–2.2)
ACETAMINOPHEN LEVEL: <5 UG/ML (ref 10–30)
ALBUMIN SERPL-MCNC: 3.7 G/DL (ref 3.4–5)
ALBUMIN SERPL-MCNC: 4.5 G/DL (ref 3.4–5)
ALP BLD-CCNC: 56 U/L (ref 40–129)
ALP BLD-CCNC: 74 U/L (ref 40–129)
ALT SERPL-CCNC: 123 U/L (ref 10–40)
ALT SERPL-CCNC: 99 U/L (ref 10–40)
AMPHETAMINE SCREEN, URINE: ABNORMAL
ANION GAP SERPL CALCULATED.3IONS-SCNC: 10 MMOL/L (ref 3–16)
ANION GAP SERPL CALCULATED.3IONS-SCNC: 17 MMOL/L (ref 3–16)
AST SERPL-CCNC: 63 U/L (ref 15–37)
AST SERPL-CCNC: 80 U/L (ref 15–37)
BARBITURATE SCREEN URINE: ABNORMAL
BASOPHILS ABSOLUTE: 0 K/UL (ref 0–0.2)
BASOPHILS RELATIVE PERCENT: 0.4 %
BENZODIAZEPINE SCREEN, URINE: ABNORMAL
BILIRUB SERPL-MCNC: <0.2 MG/DL (ref 0–1)
BILIRUB SERPL-MCNC: <0.2 MG/DL (ref 0–1)
BUN BLDV-MCNC: 22 MG/DL (ref 7–20)
BUN BLDV-MCNC: 23 MG/DL (ref 7–20)
CALCIUM SERPL-MCNC: 8.2 MG/DL (ref 8.3–10.6)
CALCIUM SERPL-MCNC: 9.3 MG/DL (ref 8.3–10.6)
CANNABINOID SCREEN URINE: POSITIVE
CHLORIDE BLD-SCNC: 100 MMOL/L (ref 99–110)
CHLORIDE BLD-SCNC: 109 MMOL/L (ref 99–110)
CO2: 22 MMOL/L (ref 21–32)
CO2: 24 MMOL/L (ref 21–32)
COCAINE METABOLITE SCREEN URINE: POSITIVE
CREAT SERPL-MCNC: 1.4 MG/DL (ref 0.8–1.3)
CREAT SERPL-MCNC: 1.8 MG/DL (ref 0.8–1.3)
EKG ATRIAL RATE: 101 BPM
EKG DIAGNOSIS: NORMAL
EKG P AXIS: 55 DEGREES
EKG P-R INTERVAL: 152 MS
EKG Q-T INTERVAL: 368 MS
EKG QRS DURATION: 86 MS
EKG QTC CALCULATION (BAZETT): 477 MS
EKG R AXIS: 0 DEGREES
EKG T AXIS: 34 DEGREES
EKG VENTRICULAR RATE: 101 BPM
EOSINOPHILS ABSOLUTE: 0 K/UL (ref 0–0.6)
EOSINOPHILS RELATIVE PERCENT: 0.3 %
ETHANOL: NORMAL MG/DL (ref 0–0.08)
GFR AFRICAN AMERICAN: 47
GFR AFRICAN AMERICAN: >60
GFR NON-AFRICAN AMERICAN: 38
GFR NON-AFRICAN AMERICAN: 51
GLOBULIN: 2.5 G/DL
GLOBULIN: 3.3 G/DL
GLUCOSE BLD-MCNC: 371 MG/DL (ref 70–99)
GLUCOSE BLD-MCNC: 77 MG/DL (ref 70–99)
HCT VFR BLD CALC: 48.2 % (ref 40.5–52.5)
HEMOGLOBIN: 16 G/DL (ref 13.5–17.5)
LYMPHOCYTES ABSOLUTE: 0.8 K/UL (ref 1–5.1)
LYMPHOCYTES RELATIVE PERCENT: 9.2 %
Lab: ABNORMAL
MCH RBC QN AUTO: 29.6 PG (ref 26–34)
MCHC RBC AUTO-ENTMCNC: 33.2 G/DL (ref 31–36)
MCV RBC AUTO: 89.1 FL (ref 80–100)
METHADONE SCREEN, URINE: ABNORMAL
MONOCYTES ABSOLUTE: 0.2 K/UL (ref 0–1.3)
MONOCYTES RELATIVE PERCENT: 2.8 %
NEUTROPHILS ABSOLUTE: 7.4 K/UL (ref 1.7–7.7)
NEUTROPHILS RELATIVE PERCENT: 87.3 %
OPIATE SCREEN URINE: POSITIVE
OXYCODONE URINE: ABNORMAL
PDW BLD-RTO: 13.8 % (ref 12.4–15.4)
PH UA: 4
PHENCYCLIDINE SCREEN URINE: ABNORMAL
PLATELET # BLD: 283 K/UL (ref 135–450)
PMV BLD AUTO: 7.9 FL (ref 5–10.5)
POTASSIUM REFLEX MAGNESIUM: 4.7 MMOL/L (ref 3.5–5.1)
POTASSIUM REFLEX MAGNESIUM: 5.5 MMOL/L (ref 3.5–5.1)
PROPOXYPHENE SCREEN: ABNORMAL
RBC # BLD: 5.41 M/UL (ref 4.2–5.9)
SALICYLATE, SERUM: 0.6 MG/DL (ref 15–30)
SODIUM BLD-SCNC: 139 MMOL/L (ref 136–145)
SODIUM BLD-SCNC: 143 MMOL/L (ref 136–145)
TOTAL PROTEIN: 6.2 G/DL (ref 6.4–8.2)
TOTAL PROTEIN: 7.8 G/DL (ref 6.4–8.2)
TROPONIN: <0.01 NG/ML
WBC # BLD: 8.4 K/UL (ref 4–11)

## 2021-07-16 PROCEDURE — 36415 COLL VENOUS BLD VENIPUNCTURE: CPT

## 2021-07-16 PROCEDURE — 84484 ASSAY OF TROPONIN QUANT: CPT

## 2021-07-16 PROCEDURE — 93010 ELECTROCARDIOGRAM REPORT: CPT | Performed by: INTERNAL MEDICINE

## 2021-07-16 PROCEDURE — 2580000003 HC RX 258: Performed by: EMERGENCY MEDICINE

## 2021-07-16 PROCEDURE — 80053 COMPREHEN METABOLIC PANEL: CPT

## 2021-07-16 PROCEDURE — 82077 ASSAY SPEC XCP UR&BREATH IA: CPT

## 2021-07-16 PROCEDURE — 71045 X-RAY EXAM CHEST 1 VIEW: CPT

## 2021-07-16 PROCEDURE — 93005 ELECTROCARDIOGRAM TRACING: CPT | Performed by: EMERGENCY MEDICINE

## 2021-07-16 PROCEDURE — 80307 DRUG TEST PRSMV CHEM ANLYZR: CPT

## 2021-07-16 PROCEDURE — 80179 DRUG ASSAY SALICYLATE: CPT

## 2021-07-16 PROCEDURE — 80143 DRUG ASSAY ACETAMINOPHEN: CPT

## 2021-07-16 PROCEDURE — 85025 COMPLETE CBC W/AUTO DIFF WBC: CPT

## 2021-07-16 RX ORDER — 0.9 % SODIUM CHLORIDE 0.9 %
1000 INTRAVENOUS SOLUTION INTRAVENOUS ONCE
Status: COMPLETED | OUTPATIENT
Start: 2021-07-16 | End: 2021-07-16

## 2021-07-16 RX ADMIN — SODIUM CHLORIDE 1000 ML: 9 INJECTION, SOLUTION INTRAVENOUS at 01:28

## 2021-07-16 RX ADMIN — SODIUM CHLORIDE 1000 ML: 9 INJECTION, SOLUTION INTRAVENOUS at 00:31

## 2021-07-16 ASSESSMENT — PAIN SCALES - GENERAL: PAINLEVEL_OUTOF10: 0

## 2021-07-16 NOTE — ED NOTES
Urinal @ bedside and is aware of need for urine specimen. Pt given phone to call family per request. Fall risk screening completed. Fall risk bracelet applied to patient. Non-skid socks provided and placed on patient. The fall risk is indicated. Based on score,  The call light is within the patient's reach, and instructions for use were provided. The bed is in the lowest position with wheels locked. The patient has been advised to notify staff, using the call light, if there is a need to get up or use restroom. The patient verbalized understanding of safety precautions and how to contact staff for assistance.       Maida Winter RN  07/16/21 [address], RN  07/16/21 7967

## 2021-07-16 NOTE — ED NOTES
Pt requesting to go home now. Friend @ bedside and is wanting to take pt home now. Md aware.       Dada Kirkland, RN  07/16/21 0129

## 2021-07-16 NOTE — ED NOTES
Portable xray of chest done. Pt repositions in bed w assist. Warm blankets applied for comfort.       Johana Haskins RN  07/16/21 3591

## 2021-07-16 NOTE — ED NOTES
MD Carrera Rota into see pt and review discharge and f/u care plan.      Christy Mccarthy RN  07/16/21 [id number]

## 2021-07-16 NOTE — ED PROVIDER NOTES
[facility] Emergency Department    CHIEF COMPLAINT  Chief Complaint   Patient presents with    Drug Overdose     reports from EMS pt unresponsive given 2mg narcan IM then additional 2 mg narcan IM       HISTORY OF PRESENT ILLNESS  Janelle Vaz is a 63 y.o. male  who presents to the ED complaining of suspected overdose. He apparently received 4 mg of intramuscular Narcan total prehospital and has a nasal trumpet in place on arrival.  He is alert but a little disoriented and confused. He does admit to heroin use. He also says that he may have done other drugs tonight as well. He denied any alcohol use to me. He feels fatigued currently. He denies any pain anywhere. We do not have any definitive history of any trauma. His sugar was in the 300s per EMS. He denies any headache or numbness or weakness or tingling anywhere but feels generally fatigued. He has no chest pain or shortness of breath. EMS tells us that police found some drug paraphernalia, specifically \"crack pipes\" on scene, but unclear if pt was using it. He did not get bystander CPR and was not pulseless at any point. No other complaints, modifying factors or associated symptoms. I have reviewed the following from the nursing documentation.     Past Medical History:   Diagnosis Date    Back pain     Depression     Erectile dysfunction     Hypertension     IFG (impaired fasting glucose)     Kidney stone     Obesity      Past Surgical History:   Procedure Laterality Date    BACK SURGERY  07/01/2016    microlumber discectomy L5-S1    EXCISION / BIOPSY SKIN LESION OF TRUNK N/A 8/23/2019    EXCISION LIPOMA ON BACK performed by Lior Houston MD at [facility]      all 3    KIDNEY STONE SURGERY Left 1996    ATTEMPTED RETRIEVAL X3     Family History   Problem Relation Age of Onset    Mental Illness Other      Social History     Socioeconomic History    Marital status:      Spouse name: Not on file    Number of children: Not on file    Years of education: Not on file    Highest education level: Not on file   Occupational History    Not on file   Tobacco Use    Smoking status: Former Smoker     Packs/day: 1.00     Years: 15.00     Pack years: 15.00     Types: Cigarettes     Quit date: 1993     Years since quittin.7    Smokeless tobacco: Never Used   Vaping Use    Vaping Use: Never used   Substance and Sexual Activity    Alcohol use: Yes     Comment: very rare    Drug use: Yes     Types: Other-see comments     Comment: heroin/ snorted    Sexual activity: Not on file     Comment: unknown   Other Topics Concern    Not on file   Social History Narrative    Not on file     Social Determinants of Health     Financial Resource Strain:     Difficulty of Paying Living Expenses:    Food Insecurity:     Worried About Running Out of Food in the Last Year:     [address] in the Last Year:    Transportation Needs:     Lack of Transportation (Medical):  Lack of Transportation (Non-Medical):    Physical Activity:     Days of Exercise per Week:     Minutes of Exercise per Session:    Stress:     Feeling of Stress :    Social Connections:     Frequency of Communication with Friends and Family:     Frequency of Social Gatherings with Friends and Family:     Attends [employer] Services:     Active Member of Clubs or Organizations:     Attends Club or Organization Meetings:     Marital Status:    Intimate Partner Violence:     Fear of Current or Ex-Partner:     Emotionally Abused:     Physically Abused:     Sexually Abused:      No current facility-administered medications for this encounter.      Current Outpatient Medications   Medication Sig Dispense Refill    tadalafil (CIALIS) 20 MG tablet Take 1 tablet by mouth daily as needed for Erectile Dysfunction 30 tablet 5    hydroCHLOROthiazide (HYDRODIURIL) 25 MG tablet Take 1 tablet by mouth every morning 30 tablet 5  amLODIPine (NORVASC) 5 MG tablet Take 1 tablet by mouth daily 30 tablet 5    buPROPion (WELLBUTRIN XL) 150 MG extended release tablet Take 1 tablet by mouth 2 times daily 180 tablet 1     No Known Allergies    REVIEW OF SYSTEMS  10 systems reviewed, pertinent positives per HPI otherwise noted to be negative. PHYSICAL EXAM  BP (!) 143/84   Pulse 92   Temp 97.8 °F (36.6 °C) (Temporal)   Resp 12   Ht 5' 10\" (1.778 m)   Wt 224 lb 3.3 oz (101.7 kg)   SpO2 98%   BMI 32.17 kg/m²    GENERAL APPEARANCE: Drowsy but arouses to voice/conversation. Cooperative. distress. HENT: Normocephalic. Atraumatic. Mucous membranes are dry. NECK: Supple. No Cspine ttp. EYES: PERRL. EOM's grossly intact. HEART/CHEST: RRR/borderline tachycardia. No murmurs. No chest wall tenderness. LUNGS: Respirations shallow but unlabored. CTAB. Good air exchange. Speaking comfortably in full sentences. ABDOMEN: No tenderness. Soft. Non-distended. No masses. No organomegaly. No guarding or rebound. Normal bowel sounds throughout. MUSCULOSKELETAL: No extremity edema. Compartments soft. No deformity. No tenderness in the extremities. All extremities neurovascularly intact. SKIN: Warm and dry. No acute rashes. NEUROLOGICAL: Alert and initially disoriented/confused. CN's 2-12 intact. No gross facial drooping. Strength 5/5, sensation intact. 2 plus DTR's in knees bilaterally. Gait assessed later on ED visit after he was observed and normal.  PSYCHIATRIC: Normal mood and affect. No SI or HI.    LABS  I have reviewed all labs for this visit.    Results for orders placed or performed during the hospital encounter of 07/15/21   CBC Auto Differential   Result Value Ref Range    WBC 8.4 4.0 - 11.0 K/uL    RBC 5.41 4.20 - 5.90 M/uL    Hemoglobin 16.0 13.5 - 17.5 g/dL    Hematocrit 48.2 40.5 - 52.5 %    MCV 89.1 80.0 - 100.0 fL    MCH 29.6 26.0 - 34.0 pg    MCHC 33.2 31.0 - 36.0 g/dL    RDW 13.8 12.4 - 15.4 %    Platelets 681 923 - 907 K/uL    MPV 7.9 5.0 - 10.5 fL    Neutrophils % 87.3 %    Lymphocytes % 9.2 %    Monocytes % 2.8 %    Eosinophils % 0.3 %    Basophils % 0.4 %    Neutrophils Absolute 7.4 1.7 - 7.7 K/uL    Lymphocytes Absolute 0.8 (L) 1.0 - 5.1 K/uL    Monocytes Absolute 0.2 0.0 - 1.3 K/uL    Eosinophils Absolute 0.0 0.0 - 0.6 K/uL    Basophils Absolute 0.0 0.0 - 0.2 K/uL   Comprehensive Metabolic Panel w/ Reflex to MG   Result Value Ref Range    Sodium 139 136 - 145 mmol/L    Potassium reflex Magnesium 5.5 (H) 3.5 - 5.1 mmol/L    Chloride 100 99 - 110 mmol/L    CO2 22 21 - 32 mmol/L    Anion Gap 17 (H) 3 - 16    Glucose 371 (H) 70 - 99 mg/dL    BUN 23 (H) 7 - 20 mg/dL    CREATININE 1.8 (H) 0.8 - 1.3 mg/dL    GFR Non- 38 (A) >60    GFR  47 (A) >60    Calcium 9.3 8.3 - 10.6 mg/dL    Total Protein 7.8 6.4 - 8.2 g/dL    Albumin 4.5 3.4 - 5.0 g/dL    Albumin/Globulin Ratio 1.4 1.1 - 2.2    Total Bilirubin <0.2 0.0 - 1.0 mg/dL    Alkaline Phosphatase 74 40 - 129 U/L     (H) 10 - 40 U/L    AST 80 (H) 15 - 37 U/L    Globulin 3.3 g/dL   Troponin   Result Value Ref Range    Troponin <0.01 <0.01 ng/mL   Ethanol   Result Value Ref Range    Ethanol Lvl None Detected mg/dL   Acetaminophen Level   Result Value Ref Range    Acetaminophen Level <5 (L) 10 - 30 ug/mL   Salicylate   Result Value Ref Range    Salicylate, Serum 0.6 (L) 15.0 - 30.0 mg/dL   Urine Drug Screen   Result Value Ref Range    Amphetamine Screen, Urine Neg Negative <1000ng/mL    Barbiturate Screen, Ur Neg Negative <200 ng/mL    Benzodiazepine Screen, Urine Neg Negative <200 ng/mL    Cannabinoid Scrn, Ur POSITIVE (A) Negative <50 ng/mL    Cocaine Metabolite Screen, Urine POSITIVE (A) Negative <300 ng/mL    Opiate Scrn, Ur POSITIVE (A) Negative <300 ng/mL    PCP Screen, Urine Neg Negative <25 ng/mL    Methadone Screen, Urine Neg Negative <300 ng/mL    Propoxyphene Scrn, Ur Neg Negative <300 ng/mL    Oxycodone Urine Neg Negative <100 ng/ml pH, UA 4.0     Drug Screen Comment: see below    Comprehensive Metabolic Panel w/ Reflex to MG   Result Value Ref Range    Sodium 143 136 - 145 mmol/L    Potassium reflex Magnesium 4.7 3.5 - 5.1 mmol/L    Chloride 109 99 - 110 mmol/L    CO2 24 21 - 32 mmol/L    Anion Gap 10 3 - 16    Glucose 77 70 - 99 mg/dL    BUN 22 (H) 7 - 20 mg/dL    CREATININE 1.4 (H) 0.8 - 1.3 mg/dL    GFR Non- 51 (A) >60    GFR African American >60 >60    Calcium 8.2 (L) 8.3 - 10.6 mg/dL    Total Protein 6.2 (L) 6.4 - 8.2 g/dL    Albumin 3.7 3.4 - 5.0 g/dL    Albumin/Globulin Ratio 1.5 1.1 - 2.2    Total Bilirubin <0.2 0.0 - 1.0 mg/dL    Alkaline Phosphatase 56 40 - 129 U/L    ALT 99 (H) 10 - 40 U/L    AST 63 (H) 15 - 37 U/L    Globulin 2.5 g/dL       The 12 lead EKG was interpreted by me as follows:  Rate: tachycardia with a rate of 101  Rhythm: sinus  Axis: normal  Intervals: normal MD, narrow QRS, normal QTc  ST segments: no ST elevations or depressions  T waves: no abnormal inversions  Non-specific T wave changes: not present   Minimal LVH changes  Prior EKG comparison: EKG dated 6/20/19 is not significantly different    RADIOLOGY    XR CHEST PORTABLE    Result Date: 7/16/2021  EXAMINATION: ONE XRAY VIEW OF THE CHEST 7/16/2021 12:25 am COMPARISON: Chest x-ray 03/21/2016. HISTORY: ORDERING SYSTEM PROVIDED HISTORY: hypoxia TECHNOLOGIST PROVIDED HISTORY: Reason for exam:->hypoxia Reason for Exam: Overdose hypoxia episode Acuity: Acute Type of Exam: Initial Additional signs and symptoms: Pt given 2mg narcan IM x 2 FINDINGS: Cardiac silhouette is upper normal limits in size. Mediastinal contours are otherwise suboptimally evaluated due to rotated projection. Left basilar opacity. No pleural effusion on this projection. No pneumothorax appreciated on this projection. Minimal right basilar opacity could represent atelectasis. Nonspecific left basilar opacity.   Atelectasis, aspiration, and pneumonia are in the differential. ED COURSE/MDM  Patient seen and evaluated. Old records reviewed. Labs and imaging reviewed and results discussed with patient. After initial evaluation, differential diagnostic considerations included: stroke, TIA, intracranial bleed, trauma, infection/sepsis, medication side effect, intoxication/withdrawal, metabolic/electrolyte abnormalities    The patient's ED workup was notable for concern for polysubstance abuse. Pt later admits to this more clearly and has a ride home. Observed without need for redosing of narcan. No indication for HCT at this time. Mild transaminitis and ALL as well as mild hyperkalemia (not associated with EKG changes) are all improving on recheck after hydration. NO abd pain to warrant CTAP. CXR with atelectasis vs infiltrate, but no respiratory sx here, so suspect atelectasis. During the patient's ED course, the patient was given:  Medications   0.9 % sodium chloride bolus (0 mLs Intravenous Stopped 7/16/21 0127)   0.9 % sodium chloride bolus (0 mLs Intravenous Stopped 7/16/21 0308)        CLINICAL IMPRESSION  1. Polysubstance abuse (HCC)        Blood pressure (!) 143/84, pulse 92, temperature 97.8 °F (36.6 °C), temperature source Temporal, resp. rate 12, height 5' 10\" (1.778 m), weight 224 lb 3.3 oz (101.7 kg), SpO2 98 %. Octavio Beth was discharged to home in stable condition. I have discussed the findings of today's workup with the patient and addressed the patient's questions and concerns. Important warning signs as well as new or worsening symptoms which would necessitate immediate return to the ED were discussed. The plan is to discharge from the ED at this time, and the patient is in stable condition. The patient acknowledged understanding is agreeable with this plan.     Follow-up with:  Jacinto Villar MD  [address]  [phone]    Schedule an appointment as soon as possible for a visit in 2 days  For symptom re-evaluation    [facility] [id number]  [phone]  Go to   If symptoms worsen      DISCLAIMER: This chart was created using Dragon dictation software. Efforts were made by me to ensure accuracy, however some errors may be present due to limitations of this technology and occasionally words are not transcribed correctly.         Ina Best MD  07/16/21 9711

## 2022-11-27 ENCOUNTER — APPOINTMENT (OUTPATIENT)
Dept: GENERAL RADIOLOGY | Age: 62
DRG: 194 | End: 2022-11-27
Payer: MEDICAID

## 2022-11-27 ENCOUNTER — HOSPITAL ENCOUNTER (EMERGENCY)
Age: 62
Discharge: HOME | DRG: 194 | End: 2022-11-27
Attending: EMERGENCY MEDICINE
Payer: MEDICAID

## 2022-11-27 VITALS
OXYGEN SATURATION: 97 % | DIASTOLIC BLOOD PRESSURE: 88 MMHG | RESPIRATION RATE: 20 BRPM | HEART RATE: 98 BPM | SYSTOLIC BLOOD PRESSURE: 119 MMHG | BODY MASS INDEX: 37.94 KG/M2 | HEIGHT: 70 IN | TEMPERATURE: 98.5 F | WEIGHT: 264.99 LBS

## 2022-11-27 DIAGNOSIS — R06.09 DYSPNEA ON EXERTION: ICD-10-CM

## 2022-11-27 DIAGNOSIS — I50.9 ACUTE CONGESTIVE HEART FAILURE, UNSPECIFIED HEART FAILURE TYPE (HCC): Primary | ICD-10-CM

## 2022-11-27 LAB
A/G RATIO: 1.5 (ref 1.1–2.2)
ALBUMIN SERPL-MCNC: 3.7 G/DL (ref 3.4–5)
ALP BLD-CCNC: 78 U/L (ref 40–129)
ALT SERPL-CCNC: 93 U/L (ref 10–40)
ANION GAP SERPL CALCULATED.3IONS-SCNC: 10 MMOL/L (ref 3–16)
AST SERPL-CCNC: 97 U/L (ref 15–37)
BASE EXCESS VENOUS: 0 MMOL/L (ref -3–3)
BASOPHILS ABSOLUTE: 0 K/UL (ref 0–0.2)
BASOPHILS RELATIVE PERCENT: 0.6 %
BILIRUB SERPL-MCNC: 0.9 MG/DL (ref 0–1)
BUN BLDV-MCNC: 24 MG/DL (ref 7–20)
CALCIUM SERPL-MCNC: 8.6 MG/DL (ref 8.3–10.6)
CHLORIDE BLD-SCNC: 105 MMOL/L (ref 99–110)
CO2: 25 MMOL/L (ref 21–32)
CREAT SERPL-MCNC: 1.3 MG/DL (ref 0.8–1.3)
D DIMER: 0.51 UG/ML FEU (ref 0–0.6)
EOSINOPHILS ABSOLUTE: 0 K/UL (ref 0–0.6)
EOSINOPHILS RELATIVE PERCENT: 0.5 %
GFR SERPL CREATININE-BSD FRML MDRD: >60 ML/MIN/{1.73_M2}
GLUCOSE BLD-MCNC: 127 MG/DL (ref 70–99)
HCO3 VENOUS: 25.6 MMOL/L (ref 23–29)
HCT VFR BLD CALC: 39.3 % (ref 40.5–52.5)
HEMOGLOBIN: 13.3 G/DL (ref 13.5–17.5)
LACTIC ACID: 2.3 MMOL/L (ref 0.4–2)
LYMPHOCYTES ABSOLUTE: 1.2 K/UL (ref 1–5.1)
LYMPHOCYTES RELATIVE PERCENT: 16.1 %
MCH RBC QN AUTO: 29.2 PG (ref 26–34)
MCHC RBC AUTO-ENTMCNC: 34 G/DL (ref 31–36)
MCV RBC AUTO: 86 FL (ref 80–100)
MONOCYTES ABSOLUTE: 0.7 K/UL (ref 0–1.3)
MONOCYTES RELATIVE PERCENT: 10.4 %
NEUTROPHILS ABSOLUTE: 5.2 K/UL (ref 1.7–7.7)
NEUTROPHILS RELATIVE PERCENT: 72.4 %
O2 SAT, VEN: 85 %
O2 THERAPY: ABNORMAL
PCO2, VEN: 46.6 MMHG (ref 40–50)
PDW BLD-RTO: 13.7 % (ref 12.4–15.4)
PH VENOUS: 7.35 (ref 7.35–7.45)
PLATELET # BLD: 283 K/UL (ref 135–450)
PMV BLD AUTO: 8.5 FL (ref 5–10.5)
PO2, VEN: 53.3 MMHG (ref 25–40)
POTASSIUM REFLEX MAGNESIUM: 4.7 MMOL/L (ref 3.5–5.1)
PRO-BNP: 5910 PG/ML (ref 0–124)
RAPID INFLUENZA  B AGN: NEGATIVE
RAPID INFLUENZA A AGN: NEGATIVE
RBC # BLD: 4.57 M/UL (ref 4.2–5.9)
SARS-COV-2, NAAT: NOT DETECTED
SODIUM BLD-SCNC: 140 MMOL/L (ref 136–145)
TCO2 CALC VENOUS: 27 MMOL/L
TOTAL PROTEIN: 6.2 G/DL (ref 6.4–8.2)
TROPONIN: <0.01 NG/ML
WBC # BLD: 7.1 K/UL (ref 4–11)

## 2022-11-27 PROCEDURE — 87804 INFLUENZA ASSAY W/OPTIC: CPT

## 2022-11-27 PROCEDURE — 87635 SARS-COV-2 COVID-19 AMP PRB: CPT

## 2022-11-27 PROCEDURE — 96374 THER/PROPH/DIAG INJ IV PUSH: CPT

## 2022-11-27 PROCEDURE — 83880 ASSAY OF NATRIURETIC PEPTIDE: CPT

## 2022-11-27 PROCEDURE — 6360000002 HC RX W HCPCS: Performed by: EMERGENCY MEDICINE

## 2022-11-27 PROCEDURE — 80053 COMPREHEN METABOLIC PANEL: CPT

## 2022-11-27 PROCEDURE — 84484 ASSAY OF TROPONIN QUANT: CPT

## 2022-11-27 PROCEDURE — 85025 COMPLETE CBC W/AUTO DIFF WBC: CPT

## 2022-11-27 PROCEDURE — 82803 BLOOD GASES ANY COMBINATION: CPT

## 2022-11-27 PROCEDURE — 93005 ELECTROCARDIOGRAM TRACING: CPT | Performed by: EMERGENCY MEDICINE

## 2022-11-27 PROCEDURE — 71046 X-RAY EXAM CHEST 2 VIEWS: CPT

## 2022-11-27 PROCEDURE — 85379 FIBRIN DEGRADATION QUANT: CPT

## 2022-11-27 PROCEDURE — 36415 COLL VENOUS BLD VENIPUNCTURE: CPT

## 2022-11-27 PROCEDURE — 83605 ASSAY OF LACTIC ACID: CPT

## 2022-11-27 PROCEDURE — 99285 EMERGENCY DEPT VISIT HI MDM: CPT

## 2022-11-27 RX ORDER — FUROSEMIDE 10 MG/ML
40 INJECTION INTRAMUSCULAR; INTRAVENOUS ONCE
Status: COMPLETED | OUTPATIENT
Start: 2022-11-27 | End: 2022-11-27

## 2022-11-27 RX ORDER — FUROSEMIDE 20 MG/1
20 TABLET ORAL 2 TIMES DAILY
Qty: 20 TABLET | Refills: 0 | Status: ON HOLD | OUTPATIENT
Start: 2022-11-27 | End: 2022-11-30 | Stop reason: HOSPADM

## 2022-11-27 RX ADMIN — FUROSEMIDE 40 MG: 10 INJECTION, SOLUTION INTRAMUSCULAR; INTRAVENOUS at 21:20

## 2022-11-27 ASSESSMENT — ENCOUNTER SYMPTOMS
EYES NEGATIVE: 1
COLOR CHANGE: 0
VOMITING: 0
CHEST TIGHTNESS: 0
COUGH: 1
SHORTNESS OF BREATH: 1
WHEEZING: 0
NAUSEA: 0
DIARRHEA: 0
SORE THROAT: 0
ABDOMINAL PAIN: 0
RHINORRHEA: 0

## 2022-11-27 ASSESSMENT — PAIN SCALES - GENERAL: PAINLEVEL_OUTOF10: 4

## 2022-11-27 ASSESSMENT — PAIN DESCRIPTION - LOCATION: LOCATION: THROAT

## 2022-11-27 ASSESSMENT — PAIN DESCRIPTION - PAIN TYPE: TYPE: ACUTE PAIN

## 2022-11-27 ASSESSMENT — PAIN DESCRIPTION - DESCRIPTORS: DESCRIPTORS: SORE

## 2022-11-28 ENCOUNTER — APPOINTMENT (OUTPATIENT)
Dept: GENERAL RADIOLOGY | Age: 62
DRG: 194 | End: 2022-11-28
Payer: MEDICAID

## 2022-11-28 ENCOUNTER — HOSPITAL ENCOUNTER (INPATIENT)
Age: 62
LOS: 2 days | Discharge: HOME HEALTH | DRG: 194 | End: 2022-11-30
Attending: EMERGENCY MEDICINE | Admitting: HOSPITALIST
Payer: MEDICAID

## 2022-11-28 DIAGNOSIS — R94.31 QT PROLONGATION: ICD-10-CM

## 2022-11-28 DIAGNOSIS — I42.0 DILATED CARDIOMYOPATHY (HCC): ICD-10-CM

## 2022-11-28 DIAGNOSIS — F14.90 COCAINE USE: ICD-10-CM

## 2022-11-28 DIAGNOSIS — I50.9 ACUTE CONGESTIVE HEART FAILURE, UNSPECIFIED HEART FAILURE TYPE (HCC): Primary | ICD-10-CM

## 2022-11-28 PROBLEM — I42.9 CARDIOMYOPATHY (HCC): Status: ACTIVE | Noted: 2022-11-28

## 2022-11-28 LAB
A/G RATIO: 1.3 (ref 1.1–2.2)
ALBUMIN SERPL-MCNC: 3.6 G/DL (ref 3.4–5)
ALP BLD-CCNC: 80 U/L (ref 40–129)
ALT SERPL-CCNC: 83 U/L (ref 10–40)
ANION GAP SERPL CALCULATED.3IONS-SCNC: 14 MMOL/L (ref 3–16)
AST SERPL-CCNC: 53 U/L (ref 15–37)
BASOPHILS ABSOLUTE: 0 K/UL (ref 0–0.2)
BASOPHILS RELATIVE PERCENT: 0.6 %
BILIRUB SERPL-MCNC: 0.3 MG/DL (ref 0–1)
BUN BLDV-MCNC: 22 MG/DL (ref 7–20)
CALCIUM SERPL-MCNC: 8.4 MG/DL (ref 8.3–10.6)
CHLORIDE BLD-SCNC: 101 MMOL/L (ref 99–110)
CO2: 24 MMOL/L (ref 21–32)
CREAT SERPL-MCNC: 1.5 MG/DL (ref 0.8–1.3)
EKG ATRIAL RATE: 102 BPM
EKG ATRIAL RATE: 107 BPM
EKG DIAGNOSIS: NORMAL
EKG DIAGNOSIS: NORMAL
EKG P AXIS: 65 DEGREES
EKG P AXIS: 65 DEGREES
EKG P-R INTERVAL: 128 MS
EKG P-R INTERVAL: 130 MS
EKG Q-T INTERVAL: 378 MS
EKG Q-T INTERVAL: 388 MS
EKG QRS DURATION: 88 MS
EKG QRS DURATION: 90 MS
EKG QTC CALCULATION (BAZETT): 504 MS
EKG QTC CALCULATION (BAZETT): 505 MS
EKG R AXIS: 0 DEGREES
EKG R AXIS: 18 DEGREES
EKG T AXIS: 72 DEGREES
EKG T AXIS: 99 DEGREES
EKG VENTRICULAR RATE: 102 BPM
EKG VENTRICULAR RATE: 107 BPM
EOSINOPHILS ABSOLUTE: 0.1 K/UL (ref 0–0.6)
EOSINOPHILS RELATIVE PERCENT: 1.4 %
GFR SERPL CREATININE-BSD FRML MDRD: 52 ML/MIN/{1.73_M2}
GLUCOSE BLD-MCNC: 140 MG/DL (ref 70–99)
HCT VFR BLD CALC: 41.7 % (ref 40.5–52.5)
HEMOGLOBIN: 13.7 G/DL (ref 13.5–17.5)
LV EF: 13 %
LVEF MODALITY: NORMAL
LYMPHOCYTES ABSOLUTE: 1.3 K/UL (ref 1–5.1)
LYMPHOCYTES RELATIVE PERCENT: 18.4 %
MCH RBC QN AUTO: 29.4 PG (ref 26–34)
MCHC RBC AUTO-ENTMCNC: 32.9 G/DL (ref 31–36)
MCV RBC AUTO: 89.5 FL (ref 80–100)
MONOCYTES ABSOLUTE: 0.7 K/UL (ref 0–1.3)
MONOCYTES RELATIVE PERCENT: 9.8 %
NEUTROPHILS ABSOLUTE: 4.9 K/UL (ref 1.7–7.7)
NEUTROPHILS RELATIVE PERCENT: 69.8 %
PDW BLD-RTO: 13.7 % (ref 12.4–15.4)
PLATELET # BLD: 285 K/UL (ref 135–450)
PMV BLD AUTO: 8.6 FL (ref 5–10.5)
POTASSIUM SERPL-SCNC: 4.6 MMOL/L (ref 3.5–5.1)
PRO-BNP: 6881 PG/ML (ref 0–124)
RBC # BLD: 4.67 M/UL (ref 4.2–5.9)
SODIUM BLD-SCNC: 139 MMOL/L (ref 136–145)
TOTAL PROTEIN: 6.3 G/DL (ref 6.4–8.2)
TROPONIN: <0.01 NG/ML
WBC # BLD: 7 K/UL (ref 4–11)

## 2022-11-28 PROCEDURE — 85025 COMPLETE CBC W/AUTO DIFF WBC: CPT

## 2022-11-28 PROCEDURE — 96374 THER/PROPH/DIAG INJ IV PUSH: CPT

## 2022-11-28 PROCEDURE — 71046 X-RAY EXAM CHEST 2 VIEWS: CPT

## 2022-11-28 PROCEDURE — 6360000002 HC RX W HCPCS: Performed by: NURSE PRACTITIONER

## 2022-11-28 PROCEDURE — 83880 ASSAY OF NATRIURETIC PEPTIDE: CPT

## 2022-11-28 PROCEDURE — 36415 COLL VENOUS BLD VENIPUNCTURE: CPT

## 2022-11-28 PROCEDURE — 93306 TTE W/DOPPLER COMPLETE: CPT

## 2022-11-28 PROCEDURE — 84484 ASSAY OF TROPONIN QUANT: CPT

## 2022-11-28 PROCEDURE — 93010 ELECTROCARDIOGRAM REPORT: CPT | Performed by: INTERNAL MEDICINE

## 2022-11-28 PROCEDURE — 99285 EMERGENCY DEPT VISIT HI MDM: CPT

## 2022-11-28 PROCEDURE — 80053 COMPREHEN METABOLIC PANEL: CPT

## 2022-11-28 PROCEDURE — 6360000002 HC RX W HCPCS: Performed by: HOSPITALIST

## 2022-11-28 PROCEDURE — 1200000000 HC SEMI PRIVATE

## 2022-11-28 PROCEDURE — 2580000003 HC RX 258: Performed by: HOSPITALIST

## 2022-11-28 PROCEDURE — 93005 ELECTROCARDIOGRAM TRACING: CPT | Performed by: NURSE PRACTITIONER

## 2022-11-28 RX ORDER — FUROSEMIDE 10 MG/ML
40 INJECTION INTRAMUSCULAR; INTRAVENOUS ONCE
Status: COMPLETED | OUTPATIENT
Start: 2022-11-28 | End: 2022-11-28

## 2022-11-28 RX ORDER — SODIUM CHLORIDE 0.9 % (FLUSH) 0.9 %
5-40 SYRINGE (ML) INJECTION EVERY 12 HOURS SCHEDULED
Status: DISCONTINUED | OUTPATIENT
Start: 2022-11-28 | End: 2022-12-01 | Stop reason: HOSPADM

## 2022-11-28 RX ORDER — SODIUM CHLORIDE 0.9 % (FLUSH) 0.9 %
5-40 SYRINGE (ML) INJECTION PRN
Status: DISCONTINUED | OUTPATIENT
Start: 2022-11-28 | End: 2022-12-01 | Stop reason: HOSPADM

## 2022-11-28 RX ORDER — SODIUM CHLORIDE 9 MG/ML
INJECTION, SOLUTION INTRAVENOUS PRN
Status: DISCONTINUED | OUTPATIENT
Start: 2022-11-28 | End: 2022-12-01 | Stop reason: HOSPADM

## 2022-11-28 RX ORDER — POLYETHYLENE GLYCOL 3350 17 G/17G
17 POWDER, FOR SOLUTION ORAL DAILY PRN
Status: DISCONTINUED | OUTPATIENT
Start: 2022-11-28 | End: 2022-12-01 | Stop reason: HOSPADM

## 2022-11-28 RX ORDER — ACETAMINOPHEN 325 MG/1
650 TABLET ORAL EVERY 6 HOURS PRN
Status: DISCONTINUED | OUTPATIENT
Start: 2022-11-28 | End: 2022-12-01 | Stop reason: HOSPADM

## 2022-11-28 RX ORDER — ACETAMINOPHEN 650 MG/1
650 SUPPOSITORY RECTAL EVERY 6 HOURS PRN
Status: DISCONTINUED | OUTPATIENT
Start: 2022-11-28 | End: 2022-12-01 | Stop reason: HOSPADM

## 2022-11-28 RX ORDER — FUROSEMIDE 10 MG/ML
40 INJECTION INTRAMUSCULAR; INTRAVENOUS DAILY
Status: DISCONTINUED | OUTPATIENT
Start: 2022-11-29 | End: 2022-11-29

## 2022-11-28 RX ORDER — ENOXAPARIN SODIUM 100 MG/ML
40 INJECTION SUBCUTANEOUS DAILY
Status: DISCONTINUED | OUTPATIENT
Start: 2022-11-28 | End: 2022-11-28

## 2022-11-28 RX ORDER — ONDANSETRON 4 MG/1
4 TABLET, ORALLY DISINTEGRATING ORAL EVERY 8 HOURS PRN
Status: DISCONTINUED | OUTPATIENT
Start: 2022-11-28 | End: 2022-12-01 | Stop reason: HOSPADM

## 2022-11-28 RX ORDER — ENOXAPARIN SODIUM 100 MG/ML
30 INJECTION SUBCUTANEOUS 2 TIMES DAILY
Status: DISCONTINUED | OUTPATIENT
Start: 2022-11-28 | End: 2022-12-01 | Stop reason: HOSPADM

## 2022-11-28 RX ORDER — ONDANSETRON 2 MG/ML
4 INJECTION INTRAMUSCULAR; INTRAVENOUS EVERY 6 HOURS PRN
Status: DISCONTINUED | OUTPATIENT
Start: 2022-11-28 | End: 2022-12-01 | Stop reason: HOSPADM

## 2022-11-28 RX ADMIN — Medication 10 ML: at 21:20

## 2022-11-28 RX ADMIN — ENOXAPARIN SODIUM 30 MG: 100 INJECTION SUBCUTANEOUS at 21:20

## 2022-11-28 RX ADMIN — FUROSEMIDE 40 MG: 10 INJECTION, SOLUTION INTRAMUSCULAR; INTRAVENOUS at 12:43

## 2022-11-28 ASSESSMENT — PAIN - FUNCTIONAL ASSESSMENT
PAIN_FUNCTIONAL_ASSESSMENT: NONE - DENIES PAIN
PAIN_FUNCTIONAL_ASSESSMENT: NONE - DENIES PAIN

## 2022-11-28 ASSESSMENT — PAIN SCALES - GENERAL: PAINLEVEL_OUTOF10: 0

## 2022-11-28 NOTE — ED PROVIDER NOTES
[address]      Pt Name: Ruben Burt  MRN: [id number]  [place] 1960  Date ofevaluation: 11/27/2022  Provider: Kendal Martinez MD    CHIEF COMPLAINT       Chief Complaint   Patient presents with    Shortness of Breath     X2wks worsening, pt states his respiratory distress wakes him from sleep, and it has increased his anxiety         HISTORY OF PRESENT ILLNESS   (Location/Symptom, Timing/Onset,Context/Setting, Quality, Duration, Modifying Factors, Severity)  Note limiting factors. Ruben Burt is a 60 y.o. male  who  has a past medical history of Back pain, Depression, Erectile dysfunction, Hypertension, IFG (impaired fasting glucose), Kidney stone, and Obesity. who presents to the emergency department    67-year-old male who presents for shortness of breath which been worsening over the last 2 weeks. Patient states is worse when he lies flat. Also associated with significant anxiety and cough. Cough is nonproductive. No fever. Of note patient has a history of polysubstance abuse with opiates and crack cocaine. Patient does not smoke. Also has risk factor of history of hypertension. Patient states she has not seen a doctor in over 2 years. No measured fevers. No chest pain no abdominal pain. Nothing else seems to make his symptoms better besides sitting up. Symptoms have been gradually worsening. The history is provided by the patient. No  was used. NursingNotes were reviewed. REVIEW OF SYSTEMS    (2-9 systems for level 4, 10 or more for level 5)     Review of Systems   Constitutional: Negative. Negative for fatigue and fever. HENT:  Negative for congestion, rhinorrhea and sore throat. Eyes: Negative. Respiratory:  Positive for cough and shortness of breath. Negative for chest tightness and wheezing. Cardiovascular:  Negative for chest pain and leg swelling.    Gastrointestinal: Negative for abdominal pain, diarrhea, nausea and vomiting. Genitourinary: Negative. Musculoskeletal: Negative. Skin:  Negative for color change and rash. Allergic/Immunologic: Negative for environmental allergies and immunocompromised state. Neurological:  Negative for dizziness, light-headedness and headaches. Hematological: Negative. Psychiatric/Behavioral:  The patient is nervous/anxious. All other systems reviewed and are negative. Except as noted above the remainder of the review of systems was reviewed and negative. PAST MEDICAL HISTORY     Past Medical History:   Diagnosis Date    Back pain     Depression     Erectile dysfunction     Hypertension     IFG (impaired fasting glucose)     Kidney stone     Obesity          SURGICALHISTORY       Past Surgical History:   Procedure Laterality Date    BACK SURGERY  07/01/2016    microlumber discectomy L5-S1    EXCISION / BIOPSY SKIN LESION OF TRUNK N/A 8/23/2019    EXCISION LIPOMA ON BACK performed by Bianka Prasad MD at [facility]      all 3    KIDNEY STONE SURGERY Left 1996    ATTEMPTED RETRIEVAL X3         CURRENT MEDICATIONS       Previous Medications    AMLODIPINE (NORVASC) 5 MG TABLET    Take 1 tablet by mouth daily    BUPROPION (WELLBUTRIN XL) 150 MG EXTENDED RELEASE TABLET    Take 1 tablet by mouth 2 times daily    HYDROCHLOROTHIAZIDE (HYDRODIURIL) 25 MG TABLET    Take 1 tablet by mouth every morning    TADALAFIL (CIALIS) 20 MG TABLET    Take 1 tablet by mouth daily as needed for Erectile Dysfunction            Patient has no known allergies.     FAMILY HISTORY       Family History   Problem Relation Age of Onset    Mental Illness Other           SOCIAL HISTORY       Social History     Socioeconomic History    Marital status:      Spouse name: None    Number of children: None    Years of education: None    Highest education level: None   Tobacco Use    Smoking status: Former     Packs/day: 1.00 Years: 15.00     Pack years: 15.00     Types: Cigarettes     Quit date: 1993     Years since quittin.0    Smokeless tobacco: Never   Vaping Use    Vaping Use: Never used   Substance and Sexual Activity    Alcohol use: Yes     Comment: very rare    Drug use: Yes     Types: Other-see comments, Cocaine     Comment: 21  snorts cocaine regularly      heroin/ snorted       SCREENINGS             PHYSICAL EXAM    (up to 7 for level 4, 8 or more for level 5)     ED Triage Vitals   BP Temp Temp Source Heart Rate Resp SpO2 Height Weight   22 1835 22 1844 22 1844 22 1835 22 1835 22 183 -- --   (!) 138/105 98.5 °F (36.9 °C) Oral (!) 110 25 96 %         Physical Exam  Vitals and nursing note reviewed. Constitutional:       General: He is not in acute distress. Appearance: He is well-developed and normal weight. He is diaphoretic. He is not ill-appearing or toxic-appearing. HENT:      Head: Normocephalic and atraumatic. Mouth/Throat:      Mouth: Mucous membranes are moist.      Pharynx: Oropharynx is clear. Eyes:      Extraocular Movements: Extraocular movements intact. Cardiovascular:      Rate and Rhythm: Regular rhythm. Tachycardia present. Pulses: Normal pulses. Heart sounds: Normal heart sounds. Pulmonary:      Effort: Pulmonary effort is normal.      Breath sounds: Examination of the right-lower field reveals decreased breath sounds. Examination of the left-lower field reveals decreased breath sounds. Decreased breath sounds present. No wheezing, rhonchi or rales. Chest:      Chest wall: No tenderness. Abdominal:      General: Bowel sounds are normal.      Palpations: Abdomen is soft. Tenderness: There is no abdominal tenderness. Musculoskeletal:         General: Normal range of motion. Cervical back: Normal range of motion and neck supple. Right lower leg: No edema. Left lower leg: No edema.    Skin: General: Skin is warm. Capillary Refill: Capillary refill takes less than 2 seconds. Findings: No rash. Neurological:      General: No focal deficit present. Mental Status: He is alert. Psychiatric:         Mood and Affect: Mood is anxious. Behavior: Behavior normal.       RESULTS     EKG: All EKG's are interpreted by the Emergency Department Physician who either signs or Co-signs this chart in the absence of a cardiologist.    EKG Interpretation    Interpreted by emergency department physician    Rhythm: Sinus tachycardia  Rate: 107  Axis: normal  Ectopy: none  Conduction: normal  ST Segments: normal  T Waves: normal  Q Waves: none    Clinical Impression: Sinus tachycardia no significant T wave or ST changes. Normal IA interval, normal QRS duration, normal QT QTC. Normal axis. No arrhythmia or signs of ischemia. Interpreted by myself. RADIOLOGY:   Non-plain filmimages such as CT, Ultrasound and MRI are read by the radiologist.  All images reviewed by the emergency department physician who either signs or cosigns this chart. Interpretation per the Radiologist below, if available at the time ofthis note:    XR CHEST (2 VW)   Preliminary Result   Cardiomegaly without acute cardiopulmonary process identified.                ED BEDSIDE ULTRASOUND:   Performed by ED Physician - none    LABS:  Labs Reviewed   BLOOD GAS, VENOUS - Abnormal; Notable for the following components:       Result Value    pH, Vikram 7.348 (*)     pO2, Vikram 53.3 (*)     All other components within normal limits   BRAIN NATRIURETIC PEPTIDE - Abnormal; Notable for the following components:    Pro-BNP 5,910 (*)     All other components within normal limits   CBC WITH AUTO DIFFERENTIAL - Abnormal; Notable for the following components:    Hemoglobin 13.3 (*)     Hematocrit 39.3 (*)     All other components within normal limits   COMPREHENSIVE METABOLIC PANEL W/ REFLEX TO MG FOR LOW K - Abnormal; Notable for the following components:    Glucose 127 (*)     BUN 24 (*)     Total Protein 6.2 (*)     ALT 93 (*)     AST 97 (*)     All other components within normal limits   LACTIC ACID - Abnormal; Notable for the following components:    Lactic Acid 2.3 (*)     All other components within normal limits   COVID-19, RAPID   RAPID INFLUENZA A/B ANTIGENS   TROPONIN   D-DIMER, QUANTITATIVE       All other labs were within normal range or not returned as of this dictation. EMERGENCY DEPARTMENT COURSE and DIFFERENTIAL DIAGNOSIS/MDM:   Vitals:    Vitals:    11/27/22 1930 11/27/22 2000 11/27/22 2001 11/27/22 2030   BP: 130/89 119/88     Pulse: (!) 104 98 99 98   Resp: 18 19 20 20   Temp:       TempSrc:       SpO2: 98% (!) 88% 92% 97%   Weight:  264 lb 15.9 oz (120.2 kg)     Height:  5' 10\" (1.778 m)         Patient was given thefollowing medications:  Medications   furosemide (LASIX) injection 40 mg (has no administration in time range)       ED COURSE & MEDICAL DECISION MAKING    Pertinent Labs & Imaging studies reviewed. (See chart for details)   -  Patient seen and evaluated in the emergency department. -  Triage and nursing notes reviewed and incorporated. -  Old chart records reviewed and incorporated. -  Differential diagnosis includes: Anxiety, COVID, influenza, viral illness, URI acute Coronary Syndrome, Congestive Heart Failure, Myocardial Infarction, Pulmonary Embolus, Pneumonia, Pneumothorax, other    77-year-old male presents for shortness of breath fluctuating intensity worse over the last 2 weeks. Worse with lying flat. Patient does not show other signs and symptoms of fluid overload however patient with history of polysubstance abuse, hypertension. Cannot rule out congestive heart failure. Will obtain BNP and chest x-ray. We will also obtain EKG and further cardiac work-up. Patient is tachycardic. Cannot rule out PE will obtain D-dimer as well as infectious studies with lactate.   Will reeval closely and disposition accordingly. COVID and flu sent as well. We will hold off on IV fluids until chest x-ray. -  Work-up included:  See above  -  ED treatment included: See above  -  Results discussed with patient. The patient is agreeable with plan of care and disposition. Is this patient to be included in the SEP-1 Core Measure due to severe sepsis or septic shock? No   Exclusion criteria - the patient is NOT to be included for SEP-1 Core Measure due to: Alternative explanation for abnormal labs/vitals that do not relate to sepsis, see [facility] for further explanation     REASSESSMENT     ED Course as of 11/27/22 2119   [place] [facility] Nov 27, 2022 2011 Patient with significantly elevated BNP. Patient has episodes of hypoxia down to 88%. Improved when patient sits up and takes deep breaths. Patient still on room air currently. [SC]   2035 D-dimer negative for age. [SC]   2115 X-ray shows cardiomegaly. Patient is saturating 97% as long as he sitting up is still subjectively short of breath. I recommended admission for new onset CHF. Patient declines admission at this time. He does not want to be admitted. I will give patient 1 dose of Lasix and a prescription for short course of Lasix as well as referral to cardiology patient will need to follow-up with primary care however I will sign outpatient [address] [SC]      ED Course User Index  [SC] Julianne Avilez MD     I have recommended admission to the hospital, but Alric Koyanagi refuses. The risks (including but not limited to suffering and death) as well as the benefits were explained to the patient. Questions were sought and answered, the patient voiced understanding and accepts these risks. I have encouraged the patient to return to have their evaluation completed as we are glad to do so. I have also instructed Alric Koyanagi on the importance of follow-up and to return for any worsening or worrisome concerns.  Alric Koyanagi appears to have capacity to make medical decisions at this time. AMA form signed and placed on the chart. CRITICAL CARE TIME   Total Critical Care time was 33 minutes, excluding separately reportable procedures. There was a high probability of clinically significant/life threatening deterioration in the patient's condition which required my urgent intervention. This includes multiple reevaluations, vital sign monitoring, pulse oximetry monitoring, telemetry monitoring, clinical response to the IV medications, reviewing the nursing notes, consultation time, dictation/documentation time, and interpretation of the labwork. (This time excludes time spent performing procedures). CONSULTS:  None    PROCEDURES:  Unless otherwise noted below, none     Procedures    FINAL IMPRESSION      1. Acute congestive heart failure, unspecified heart failure type ([facility] [address]    2.  Dyspnea on exertion          DISPOSITION/PLAN   DISPOSITION [place] 11/27/2022 09:18:23 PM      PATIENT REFERREDTO:  Effie Frey MD  [address]  [phone]    Schedule an appointment as soon as possible for a visit       Vulcan MD Abraham  [address] [id number] [phone]    Schedule an appointment as soon as possible for a visit       [place] [address]  [phone]    Return immediately to the ER if you change your mind    DISCHARGEMEDICATIONS:  New Prescriptions    FUROSEMIDE (LASIX) 20 MG TABLET    Take 1 tablet by mouth 2 times daily for 10 days          (Please note that portions of this note were completed with a voice recognition program.  Efforts were made to edit the dictations but occasionally words are mis-transcribed.)    Jori Tan MD (electronically signed)  Attending Emergency Physician         Jori Tan MD  11/27/22 8958

## 2022-11-28 NOTE — ED NOTES
Visitor to bedside. Visitor states pt is hard headed and he has been trying to get pt to come to ED for few days. Sinus tach on monitor.         Zelalem Baez RN  11/27/22 1932

## 2022-11-28 NOTE — ED NOTES
This RN already reviewed discharge instructions/rx for home. Sore throat at 4. Now lyft here to take pt home. Assisted pt out to vehicle by wheelchair to conserve energy/prevent SOB. Independently able to get into lyft from our wheelchair without any problem. Appreciative of care.    Again states that he will come back in the am.       Nico Navas RN  11/28/22 3886

## 2022-11-28 NOTE — ED NOTES
Dr Nereida Hammans updated about this RN's assessment/chief complaint from [address] note.         Guillermina Rothman RN  11/27/22 1931

## 2022-11-28 NOTE — PROGRESS NOTES
Medication Reconciliation    List of medications patient is currently taking is complete. Source of information: 1. Conversation with patient and patient's family at bedside                                      2. EPIC records      Allergies  Patient has no known allergies.      Keri Bhardwaj, Pharmacy Student  11/28/2022 4:24 PM

## 2022-11-28 NOTE — ED PROVIDER NOTES
[address] [address] [address]  Dept: [phone]  Loc: [address] [place] [address] COMPLAINT    Chief Complaint   Patient presents with    Shortness of Breath     Worsening over the past 2 weeks        HPI    Enzo Bustos is a 60 y.o. male who presents to the emergency department with a 2-week complaint of shortness of breath. He states that the shortness of breath and coughing worsen at bedtime when he is laying flat. He has developed a cough as well now. Patient states that he was seen at [facility] emergency department yesterday and was told that he has heart failure and was discharged home with Lasix tablets but he has not started it. He denies lightheadedness, dizziness, chest pain. He states he does break out in sweats at times when his shortness of breath gets worse. He states sometimes his legs are swollen and sometimes they are not. He denies leg swelling today. He does not weigh himself on a regular basis. He does admit to regular drug use (cocaine in particular). REVIEW OF SYSTEMS    Cardiac: no chest pain, no palpitations, no syncope  Respiratory: see HPI, + cough  Neurologic: no syncope or LOC  GI: no vomiting, no diarrhea  General: no fever  All other systems reviewed and are negative.     PAST MEDICAL & SURGICAL HISTORY    Past Medical History:   Diagnosis Date    Back pain     Depression     Erectile dysfunction     Hypertension     IFG (impaired fasting glucose)     Kidney stone     Obesity      Past Surgical History:   Procedure Laterality Date    BACK SURGERY  07/01/2016    microlumber discectomy L5-S1    EXCISION / BIOPSY SKIN LESION OF TRUNK N/A 8/23/2019    EXCISION LIPOMA ON BACK performed by Alanis De Souza MD at [address] [place] [address]       all 3    KIDNEY STONE SURGERY Left 1996    ATTEMPTED RETRIEVAL X3       CURRENT MEDICATIONS  (may include discharge medications prescribed in the ED)  Current Outpatient Rx   Medication Sig Dispense Refill    furosemide (LASIX) 20 MG tablet Take 1 tablet by mouth 2 times daily for 10 days 20 tablet 0    tadalafil (CIALIS) 20 MG tablet Take 1 tablet by mouth daily as needed for Erectile Dysfunction (Patient not taking: Reported on 2022) 30 tablet 5    hydroCHLOROthiazide (HYDRODIURIL) 25 MG tablet Take 1 tablet by mouth every morning (Patient not taking: Reported on 2022) 30 tablet 5    amLODIPine (NORVASC) 5 MG tablet Take 1 tablet by mouth daily (Patient not taking: Reported on 2022) 30 tablet 5    buPROPion (WELLBUTRIN XL) 150 MG extended release tablet Take 1 tablet by mouth 2 times daily (Patient not taking: Reported on 2022) 180 tablet 1       ALLERGIES    No Known Allergies    SOCIAL & FAMILY HISTORY    Social History     Socioeconomic History    Marital status:    Tobacco Use    Smoking status: Former     Packs/day: 1.00     Years: 15.00     Pack years: 15.00     Types: Cigarettes     Quit date: 1993     Years since quittin.0    Smokeless tobacco: Never   Vaping Use    Vaping Use: Never used   Substance and Sexual Activity    Alcohol use: Yes     Comment: very rare    Drug use: Yes     Types: Other-see comments, Cocaine     Comment: 21  snorts cocaine regularly      heroin/ snorted     Family History   Problem Relation Age of Onset    Mental Illness Other        PHYSICAL EXAM    VITAL SIGNS: BP (!) 134/97   Pulse (!) 106   Temp 97 °F (36.1 °C)   Resp 25   Ht 5' 10\" (1.778 m)   Wt 262 lb 12.6 oz (119.2 kg)   SpO2 96%   BMI 37.71 kg/m²    Constitutional:  Well developed, well nourished, tachypneic  HENT:  Atraumatic, dry mucus membranes  Neck: supple, no JVD   Respiratory: Respiratory rate about 26 breaths/min during my initial evaluation. He has a cough that is wet. He has crackles in his bases. No distress.    Cardiovascular: Tachycardic rate with regular rhythm. S1-S2  Vascular: Radial and DP pulses 2+ and equal bilaterally  GI:  Soft, nontender, normal bowel sounds  Musculoskeletal:  no lower extremity edema, no lower extremity asymmetry, no calf tenderness, no thigh tenderness, no acute deformities  Integument:  Skin is warm and dry, no petechiae   Neurologic:  Alert & oriented, no slurred speech  Psych: Pleasant affect, no hallucinations, anxious    EKG      Ventricular rate 102 bpm, parable 130 ms, QRS duration 90 ms, QTc 505 ms  No ST elevation or depression. Interpretation is a sinus tachycardia    Today's tracing was compared to the 1 on November 27, 2022. Patient has sinus tachycardia with a rate of 107 bpm.  Otherwise no significant changes. RADIOLOGY/PROCEDURES    XR CHEST (2 VW)   Final Result   Clear lungs. Labs Reviewed   COMPREHENSIVE METABOLIC PANEL - Abnormal; Notable for the following components:       Result Value    Glucose 140 (*)     BUN 22 (*)     Creatinine 1.5 (*)     Est, Glom Filt Rate 52 (*)     Total Protein 6.3 (*)     ALT 83 (*)     AST 53 (*)     All other components within normal limits   BRAIN NATRIURETIC PEPTIDE - Abnormal; Notable for the following components:    Pro-BNP 6,881 (*)     All other components within normal limits   CBC WITH AUTO DIFFERENTIAL   TROPONIN       ED COURSE & MEDICAL DECISION MAKING    Pertinent Labs & Imaging studies reviewed and interpreted. (See chart for details)    See chart for details of medications given during the ED stay.     Vitals:    11/28/22 1234 11/28/22 1238 11/28/22 1240 11/28/22 1244   BP:       Pulse: (!) 102 100 (!) 101 (!) 106   Resp: 21 19 25    Temp:       SpO2: 100% 100% 96%    Weight:       Height:         Medications   furosemide (LASIX) injection 40 mg (40 mg IntraVENous Given 11/28/22 1243)     This patient was seen evaluated by myself my attending physician Dr. Verl Dubin    Differential diagnosis includes was not limited to influenza, COVID, ACS, heart failure, pulmonary edema, pneumonia, other    This is an anxious patient who arrives with some tachypnea he arrives afebrile. He is mildly tachycardic with a heart rate of 108 bpm.  He is hypertensive with a blood pressure of 154/90. He is not hypoxic or in respiratory distress. His cough is wet and he has fine crackles on exam.  No extremity edema. His CBC is unremarkable. Chemistry panel reveals a BUN of 22 with a creatinine of 1.5. Yesterday it was 1.3. He has a serum glucose of 140. proBNP [id number] and this is compared to about 7 PM last night of 5910. He has a troponin of less than 0.01 yesterday and today. His EKG shows a sinus tachycardia with a prolonged QTC. ALT and AST are improving from yesterday at 83 and 53    Influenza and COVID testing negative on yesterday's evaluation. Chest x-ray interpreted by radiology and reviewed by myself showed  Impression   Clear lungs. Clinically he is presenting with a heart failure picture. He was placed on strict I&O. He was given 40 mg of IV push Lasix. He is quite anxious. Recommending admission with the hospitalist service. The patient and his partner were updated on the plan of care and they agree. CRITICAL CARE NOTE:  There was a high probability of clinically significant life-threatening deterioration of the patient's condition requiring my urgent intervention. I personally saw the patient and independently provided 20 minutes of non-concurrent critical care out of the total shared critical care time provided. This includes multiple reevaluations, vital sign monitoring, pulse oximetry monitoring, telemetry monitoring, clinical response to the IV medications, reviewing the nursing notes, consultation time, dictation/documentation time, and interpretation of the labwork. (This time excludes time spent performing procedures). FINAL IMPRESSION    1.  Acute congestive heart failure, unspecified heart failure type (HCC)    2. QT prolongation    3.  Cocaine use        PLAN  Admission to the hospital    (Please note that this note was completed with a voice recognition program.  Every attempt was made to edit the dictations, but inevitably there remain words that are mis-transcribed.)       Nataliia Hamilton, CT - CNP  11/28/22 1414

## 2022-11-28 NOTE — ED NOTES
[id number] Sitting on edge of bed. Reports feeling sick since 11/24. Reports SOB, cough, feeling hot, sore throat at 4. No known sick exposure. Frequently falling asleep and then O2 sat drops to 84-86%. Comes right up when name called. Denies hx of sleep apnea though. Doesn't see his PCP or take his meds that he is prescribed. No known sick exposure. When reviewing triage, pt reports that he smokes crack regularly (last use was today). Lung sounds clear. Prefers HOB to be [id number] degrees. Both side rails up.         Mehnaz Partida, RN  11/27/22 2267 [facility], RN  11/27/22 9369

## 2022-11-28 NOTE — ED NOTES
[id number] [facility] and this RN has talked with pt at length regarding need for admission for CHF. Denies chest pain, but does get easily SOB. Discharge instructions reviewed with pt. Explained rx. AMA form explained and signed by pt. Pt dressed himself and calling his ride home. Voided 400cc clear dk yellow urine. Sent pt home two urinals to use at home to decrease getting so SOB with exertion. Pt tells this RN that he wants something to eat and then he will go to [address] [place] [address],[address] ED to check in again. Pt also says that he will go in the morning. Explained to pt to call 911 if needed at home for distress.         Rashaun Loya RN  11/27/22 5145

## 2022-11-28 NOTE — ED NOTES
Voided 500 cc clear pale yellow urine. Gait steady. Now pt's friend calling a lyft for them both.      Haja Wtaers RN  11/28/22 8866

## 2022-11-28 NOTE — ED PROVIDER NOTES
Attending Supervisory Note/Shared Visit   I have personally performed a face to face diagnostic evaluation on this patient. I have reviewed the mid-levels findings and agree. History and Exam by me shows no acute distress, anxious. 2-week history of difficulty breathing, worse at night when he lies down. Increased cough at night when he lies down. Some intermittent swelling in his legs. Was seen at [address] [facility] yesterday. Had an elevated BNP, was diagnosed with CHF and discharged on Lasix. More short of breath today. General: Alert moderately obese white male, anxious. Heart: Regular rate and rhythm. No murmurs or gallops noted. Lungs: Breath sounds decreased bilaterally with scattered rales bilaterally. No wheezes. Abdomen: Obese, soft, nontender. Musculoskeletal: Trace of pitting pretibial edema. H&H of 13.7 41.7. White blood cell count 7000 with 70 neutrophils and 18 lymphs. Electrolytes are normal.  BUN of 22 with a creatinine of 1.5. ALT of 83 with an AST of 53. Troponin less than 0.01. BNP of 6881. Chest x-ray: No acute cardiopulmonary abnormalities. Patient had a 2-week history of increasing shortness of breath and orthopnea, cough at night. Clinically sounds like he has some heart failure. His BNP is increased today from the value at [address] yesterday. He was given 40 mg of Lasix IV. Hospitalist will be consulted to admit for further evaluation for congestive heart failure. Test results, diagnosis, and treatment plan were discussed with the patient. He understands her treatment plan and need for admission is agreeable    1. Acute congestive heart failure, unspecified heart failure type (HCC)    2. QT prolongation    3.  Cocaine use      Disposition:  Admit      (Please note that portions of this note were completed with a voice recognition program.  Efforts were made to edit the dictations but occasionally words are mis-transcribed.)    Saira Whitmore MD  Attending Emergency Physician      EKG: Sinus tachycardia, rate of 102, nonspecific ST-T wave changes. Rhythm strip shows sinus tachycardia with a rate of 102, MD interval 130 ms, QRS 90 ms with no other ectopy as interpreted by me. This is compared to an EKG dated 11/27/2022, no significant changes noted.      Magnus Arciniega MD  11/28/22 1596 Yoav Schmidt MD  11/28/22 7982

## 2022-11-28 NOTE — H&P
Hospital Medicine History & Physical      PCP: Mariana Vela MD    Date of Admission: 11/28/2022    Date of Service: Pt seen/examined on 11/28/22 and Admitted to Inpatient with expected LOS greater than two midnights due to medical therapy. Chief Complaint: Shortness of breath      History Of Present Illness:      60 y.o. male with history of hypertension, depression, polysubstance abuse presented emergency room with progressive shortness of breath. .  The patient states that over the past 2 to 3 weeks, he started having congestion, cough ,sore throat and hoarseness. .  His partner also had a cough but recovered within 3 days. Over the past 1 week however, he has been experiencing progressive shortness of breath, orthopnea. .. No leg swelling. He was seen in the ED yesterday , chest x-ray showed cardiomegaly without acute infiltrates,had elevated BNP. .  Treated with Lasix for new onset CHF with plans to follow-up with cardiology outpatient. Tamiko Pritchett He returned to the ED today because his symptoms are persistent. .. Repeat chest x-ray showed cardiomegaly but without infiltrate and proBNP 6800. Tamiko Pritchett He has no peripheral edema  ,. Past Medical History:          Diagnosis Date    Back pain     Depression     Erectile dysfunction     Hypertension     IFG (impaired fasting glucose)     Kidney stone     Obesity        Past Surgical History:          Procedure Laterality Date    BACK SURGERY  07/01/2016    microlumber discectomy L5-S1    EXCISION / BIOPSY SKIN LESION OF TRUNK N/A 8/23/2019    EXCISION LIPOMA ON BACK performed by Jacqueline Cornell MD at [address]      all 3    KIDNEY STONE SURGERY Left 1996    ATTEMPTED RETRIEVAL X3       Medications Prior to Admission:      Prior to Admission medications    Medication Sig Start Date End Date Taking?  Authorizing Provider   furosemide (LASIX) 20 MG tablet Take 1 tablet by mouth 2 times daily for 10 days 11/27/22 12/7/22  Bean Espana MD tadalafil (CIALIS) 20 MG tablet Take 1 tablet by mouth daily as needed for Erectile Dysfunction  Patient not taking: Reported on 11/27/2022 2/17/21   Bakari Kelly MD   hydroCHLOROthiazide (HYDRODIURIL) 25 MG tablet Take 1 tablet by mouth every morning  Patient not taking: Reported on 11/27/2022 2/17/21   Bakari Kelly MD   amLODIPine (NORVASC) 5 MG tablet Take 1 tablet by mouth daily  Patient not taking: Reported on 11/27/2022 2/17/21   Bakari Kelly MD   buPROPion (WELLBUTRIN XL) 150 MG extended release tablet Take 1 tablet by mouth 2 times daily  Patient not taking: Reported on 11/27/2022 2/17/21   Bakari Kelly MD       Allergies:  Patient has no known allergies. Social History:      The patient currently lives     TOBACCO:   reports that he quit smoking about 29 years ago. His smoking use included cigarettes. He has a 15.00 pack-year smoking history. He has never used smokeless tobacco.  ETOH:   reports current alcohol use. Family History:      Reviewed in detail and negative for DM, CAD, Cancer, CVA. Positive as follows:        Problem Relation Age of Onset    Mental Illness Other        REVIEW OF SYSTEMS:   Pertinent positives as noted in the HPI. All other systems reviewed and negative. PHYSICAL EXAM:    BP (!) 134/97   Pulse (!) 106   Temp 97 °F (36.1 °C)   Resp 25   Ht 5' 10\" (1.778 m)   Wt 262 lb 12.6 oz (119.2 kg)   SpO2 96%   BMI 37.71 kg/m²     General appearance:  No apparent distress, appears stated age and cooperative. HEENT:  Normal cephalic, atraumatic without obvious deformity. Pupils equal, round, and reactive to light. Extra ocular muscles intact. Conjunctivae/corneas clear. Neck: Supple, with full range of motion. No jugular venous distention. Trachea midline. Respiratory:  Normal respiratory effort. Clear to auscultation, bilaterally without Rales/Wheezes/Rhonchi.   Cardiovascular:  Regular rate and rhythm with normal S1/S2 without murmurs, rubs or gallops. Abdomen: Soft, non-tender, non-distended with normal bowel sounds. Musculoskeletal:  No clubbing, cyanosis or edema bilaterally. Full range of motion without deformity. Skin: Skin color, texture, turgor normal.  No rashes or lesions. Neurologic:  Neurovascularly intact without any focal sensory/motor deficits. Cranial nerves: II-XII intact, grossly non-focal.  Psychiatric:  Alert and oriented, thought content appropriate, normal insight  Capillary Refill: Brisk,< 3 seconds   Peripheral Pulses: +2 palpable, equal bilaterally       CXR:  I have reviewed the CXR with the following interpretation:   EKG:  I have reviewed the EKG with the following interpretation:   Labs:     Recent Labs     11/27/22 1857 11/28/22  0952   WBC 7.1 7.0   HGB 13.3* 13.7   HCT 39.3* 41.7    285     Recent Labs     11/27/22 1857 11/28/22  0952    139   K 4.7 4.6    101   CO2 25 24   BUN 24* 22*   CREATININE 1.3 1.5*   CALCIUM 8.6 8.4     Recent Labs     11/27/22 1857 11/28/22  0952   AST 97* 53*   ALT 93* 83*   BILITOT 0.9 0.3   ALKPHOS 78 80     No results for input(s): INR in the last 72 hours. Recent Labs     11/27/22 1857 11/28/22  0952   TROPONINI <0.01 <0.01       Urinalysis:      Lab Results   Component Value Date/Time    BLOODU neg 06/20/2019 11:31 AM    SPECGRAV 1.025 06/20/2019 11:31 AM    GLUCOSEU neg 06/20/2019 11:31 AM         ASSESSMENT:    -New onset congestive heart failure  Reports exertional dyspnea, orthopnea  Checks x-ray shows significant cardiomegaly although without acute infiltrates. .  proBNP is elevated at 6800  Obtain 2D echo, consult cardiology    -Laryngitis-persistent hoarseness with recent viral URI--- symptoms present for 2 to 3 weeks--will order short course of steroids, if hoarseness persists, will need follow-up with ENT outpatient      -Polysubstance abuse--- endorses cocaine abuse--UDS also positive for [facility] and opiates--- was previously on Suboxone--drug rehab recommended    -History of opiate dependence-in remission--was on Suboxone in the past    -Essential hypertension--previously on amlodipine/HCTZ--has not followed up with PCP in 2 years    -CKD stage--Baseline appears to be 1.3-1.5 over the past 2 years--monitor renal function    -Elevated liver enzymes-could be due to hepatic congestion--monitor LFTs--further work-up recommended if no improvement          DVT Prophylaxis: Lovenox  Diet: Cardiac with fluid restriction  Code Status: Full code           Quan Hurt MD    Thank you Jose Manuel Kidd MD for the opportunity to be involved in this patient's care. If you have any questions or concerns please feel free to contact me at [phone].

## 2022-11-28 NOTE — ED NOTES
Still dozes off frequently. Awakens easily. O2 sat drops to 88% and now right back up to 92%. Will continue to watch pt closely.      Pebbles Dent RN  11/28/22 2377

## 2022-11-29 LAB
ALBUMIN SERPL-MCNC: 3.2 G/DL (ref 3.4–5)
ALP BLD-CCNC: 75 U/L (ref 40–129)
ALT SERPL-CCNC: 80 U/L (ref 10–40)
AMPHETAMINE SCREEN, URINE: ABNORMAL
ANION GAP SERPL CALCULATED.3IONS-SCNC: 10 MMOL/L (ref 3–16)
AST SERPL-CCNC: 46 U/L (ref 15–37)
BARBITURATE SCREEN URINE: ABNORMAL
BASOPHILS ABSOLUTE: 0.1 K/UL (ref 0–0.2)
BASOPHILS RELATIVE PERCENT: 0.6 %
BENZODIAZEPINE SCREEN, URINE: ABNORMAL
BILIRUB SERPL-MCNC: 0.5 MG/DL (ref 0–1)
BILIRUBIN DIRECT: <0.2 MG/DL (ref 0–0.3)
BILIRUBIN, INDIRECT: ABNORMAL MG/DL (ref 0–1)
BUN BLDV-MCNC: 24 MG/DL (ref 7–20)
CALCIUM SERPL-MCNC: 8.5 MG/DL (ref 8.3–10.6)
CANNABINOID SCREEN URINE: ABNORMAL
CHLORIDE BLD-SCNC: 104 MMOL/L (ref 99–110)
CHOLESTEROL, TOTAL: 121 MG/DL (ref 0–199)
CO2: 29 MMOL/L (ref 21–32)
COCAINE METABOLITE SCREEN URINE: POSITIVE
CREAT SERPL-MCNC: 1.5 MG/DL (ref 0.8–1.3)
EOSINOPHILS ABSOLUTE: 0.1 K/UL (ref 0–0.6)
EOSINOPHILS RELATIVE PERCENT: 1.7 %
FENTANYL SCREEN, URINE: ABNORMAL
GFR SERPL CREATININE-BSD FRML MDRD: 52 ML/MIN/{1.73_M2}
GLUCOSE BLD-MCNC: 143 MG/DL (ref 70–99)
HCT VFR BLD CALC: 40.9 % (ref 40.5–52.5)
HDLC SERPL-MCNC: 44 MG/DL (ref 40–60)
HEMOGLOBIN: 13.9 G/DL (ref 13.5–17.5)
LDL CHOLESTEROL CALCULATED: 61 MG/DL
LYMPHOCYTES ABSOLUTE: 1 K/UL (ref 1–5.1)
LYMPHOCYTES RELATIVE PERCENT: 12.7 %
Lab: ABNORMAL
MAGNESIUM: 2 MG/DL (ref 1.8–2.4)
MCH RBC QN AUTO: 30 PG (ref 26–34)
MCHC RBC AUTO-ENTMCNC: 34 G/DL (ref 31–36)
MCV RBC AUTO: 88.1 FL (ref 80–100)
METHADONE SCREEN, URINE: ABNORMAL
MONOCYTES ABSOLUTE: 0.7 K/UL (ref 0–1.3)
MONOCYTES RELATIVE PERCENT: 8.5 %
NEUTROPHILS ABSOLUTE: 6.3 K/UL (ref 1.7–7.7)
NEUTROPHILS RELATIVE PERCENT: 76.5 %
OPIATE SCREEN URINE: ABNORMAL
OXYCODONE URINE: ABNORMAL
PDW BLD-RTO: 13.7 % (ref 12.4–15.4)
PH UA: 7.5
PHENCYCLIDINE SCREEN URINE: ABNORMAL
PLATELET # BLD: 271 K/UL (ref 135–450)
PMV BLD AUTO: 8.3 FL (ref 5–10.5)
POTASSIUM SERPL-SCNC: 4.4 MMOL/L (ref 3.5–5.1)
RBC # BLD: 4.65 M/UL (ref 4.2–5.9)
S PYO AG THROAT QL: NEGATIVE
SODIUM BLD-SCNC: 143 MMOL/L (ref 136–145)
TOTAL PROTEIN: 5.9 G/DL (ref 6.4–8.2)
TRIGL SERPL-MCNC: 81 MG/DL (ref 0–150)
VLDLC SERPL CALC-MCNC: 16 MG/DL
WBC # BLD: 8.2 K/UL (ref 4–11)

## 2022-11-29 PROCEDURE — 6370000000 HC RX 637 (ALT 250 FOR IP): Performed by: NURSE PRACTITIONER

## 2022-11-29 PROCEDURE — 83735 ASSAY OF MAGNESIUM: CPT

## 2022-11-29 PROCEDURE — 6360000002 HC RX W HCPCS: Performed by: HOSPITALIST

## 2022-11-29 PROCEDURE — 6370000000 HC RX 637 (ALT 250 FOR IP): Performed by: STUDENT IN AN ORGANIZED HEALTH CARE EDUCATION/TRAINING PROGRAM

## 2022-11-29 PROCEDURE — 2580000003 HC RX 258: Performed by: HOSPITALIST

## 2022-11-29 PROCEDURE — 80076 HEPATIC FUNCTION PANEL: CPT

## 2022-11-29 PROCEDURE — 80307 DRUG TEST PRSMV CHEM ANLYZR: CPT

## 2022-11-29 PROCEDURE — 87880 STREP A ASSAY W/OPTIC: CPT

## 2022-11-29 PROCEDURE — 80061 LIPID PANEL: CPT

## 2022-11-29 PROCEDURE — 85025 COMPLETE CBC W/AUTO DIFF WBC: CPT

## 2022-11-29 PROCEDURE — 99254 IP/OBS CNSLTJ NEW/EST MOD 60: CPT | Performed by: STUDENT IN AN ORGANIZED HEALTH CARE EDUCATION/TRAINING PROGRAM

## 2022-11-29 PROCEDURE — 80048 BASIC METABOLIC PNL TOTAL CA: CPT

## 2022-11-29 PROCEDURE — 1200000000 HC SEMI PRIVATE

## 2022-11-29 PROCEDURE — 87081 CULTURE SCREEN ONLY: CPT

## 2022-11-29 PROCEDURE — 6360000002 HC RX W HCPCS: Performed by: STUDENT IN AN ORGANIZED HEALTH CARE EDUCATION/TRAINING PROGRAM

## 2022-11-29 PROCEDURE — 6370000000 HC RX 637 (ALT 250 FOR IP): Performed by: HOSPITALIST

## 2022-11-29 RX ORDER — GUAIFENESIN/DEXTROMETHORPHAN 100-10MG/5
5 SYRUP ORAL EVERY 4 HOURS PRN
Status: DISCONTINUED | OUTPATIENT
Start: 2022-11-29 | End: 2022-12-01 | Stop reason: HOSPADM

## 2022-11-29 RX ORDER — FUROSEMIDE 10 MG/ML
40 INJECTION INTRAMUSCULAR; INTRAVENOUS 2 TIMES DAILY
Status: DISCONTINUED | OUTPATIENT
Start: 2022-11-29 | End: 2022-11-30

## 2022-11-29 RX ORDER — LANOLIN ALCOHOL/MO/W.PET/CERES
6 CREAM (GRAM) TOPICAL NIGHTLY PRN
Status: DISCONTINUED | OUTPATIENT
Start: 2022-11-29 | End: 2022-12-01 | Stop reason: HOSPADM

## 2022-11-29 RX ORDER — GUAIFENESIN/DEXTROMETHORPHAN 100-10MG/5
5 SYRUP ORAL EVERY 4 HOURS PRN
Status: CANCELLED | OUTPATIENT
Start: 2022-11-29

## 2022-11-29 RX ORDER — PREDNISONE 20 MG/1
40 TABLET ORAL DAILY
Status: DISCONTINUED | OUTPATIENT
Start: 2022-11-29 | End: 2022-12-01 | Stop reason: HOSPADM

## 2022-11-29 RX ORDER — VALSARTAN 80 MG/1
40 TABLET ORAL DAILY
Status: DISCONTINUED | OUTPATIENT
Start: 2022-11-29 | End: 2022-12-01 | Stop reason: HOSPADM

## 2022-11-29 RX ADMIN — Medication 10 ML: at 22:02

## 2022-11-29 RX ADMIN — PREDNISONE 40 MG: 20 TABLET ORAL at 06:51

## 2022-11-29 RX ADMIN — VALSARTAN 40 MG: 80 TABLET ORAL at 15:15

## 2022-11-29 RX ADMIN — GUAIFENESIN SYRUP AND DEXTROMETHORPHAN 5 ML: 100; 10 SYRUP ORAL at 16:10

## 2022-11-29 RX ADMIN — Medication 10 ML: at 09:16

## 2022-11-29 RX ADMIN — ENOXAPARIN SODIUM 30 MG: 100 INJECTION SUBCUTANEOUS at 09:16

## 2022-11-29 RX ADMIN — FUROSEMIDE 40 MG: 10 INJECTION, SOLUTION INTRAMUSCULAR; INTRAVENOUS at 09:16

## 2022-11-29 RX ADMIN — ENOXAPARIN SODIUM 30 MG: 100 INJECTION SUBCUTANEOUS at 22:03

## 2022-11-29 RX ADMIN — ACETAMINOPHEN 650 MG: 325 TABLET ORAL at 17:52

## 2022-11-29 RX ADMIN — Medication 1 LOZENGE: at 22:02

## 2022-11-29 RX ADMIN — GUAIFENESIN SYRUP AND DEXTROMETHORPHAN 5 ML: 100; 10 SYRUP ORAL at 22:02

## 2022-11-29 RX ADMIN — FUROSEMIDE 40 MG: 10 INJECTION, SOLUTION INTRAMUSCULAR; INTRAVENOUS at 17:53

## 2022-11-29 ASSESSMENT — PAIN SCALES - GENERAL
PAINLEVEL_OUTOF10: 3
PAINLEVEL_OUTOF10: 3

## 2022-11-29 ASSESSMENT — PAIN DESCRIPTION - LOCATION
LOCATION: THROAT
LOCATION: THROAT

## 2022-11-29 ASSESSMENT — PAIN DESCRIPTION - ORIENTATION: ORIENTATION: MID

## 2022-11-29 ASSESSMENT — PAIN DESCRIPTION - DESCRIPTORS
DESCRIPTORS: SORE
DESCRIPTORS: SORE

## 2022-11-29 NOTE — PLAN OF CARE
Problem: Discharge Planning  Goal: Discharge to home or other facility with appropriate resources  Outcome: Progressing     Problem: ABCDS Injury Assessment  Goal: Absence of physical injury  Outcome: Progressing     Problem: Safety - Adult  Goal: Free from fall injury  Outcome: Progressing     Problem: Cardiovascular - Adult  Goal: Maintains optimal cardiac output and hemodynamic stability  Outcome: Progressing     Problem: Cardiovascular - Adult  Goal: Absence of cardiac dysrhythmias or at baseline  Outcome: Progressing     Problem: Metabolic/Fluid and Electrolytes - Adult  Goal: Electrolytes maintained within normal limits  Outcome: Progressing     Problem: Metabolic/Fluid and Electrolytes - Adult  Goal: Hemodynamic stability and optimal renal function maintained  Outcome: Progressing

## 2022-11-29 NOTE — CONSULTS
[facility].        Chief Complaint   Patient presents with    Shortness of Breath     Worsening over the past 2 weeks             History of Present Illness:  Matilda Brannon is a 60 y.o. patient who presented to the hospital with complaints of Shortness of breath. I have been asked to provide consultation regarding further management and testing. This is a 79-year-old male who presents with acute shortness of breath has been worsening for several weeks now. He has a past medical history of depression and polysubstance abuse, as well as hypertension, hyperlipidemia. Looks like the last time that he was actually seen a physician was about 3 to 4 years ago, unable to find any sort of cardiac work-up for him. History was very difficult to obtain he was not really forthcoming with conversation looks like he was quite tired so I will try and gather additional history later when he perks up a bit. I did read his echo yesterday which shows severe systolic dysfunction with elevated filling pressures. This appears to be first time that this patient is presenting with systolic cardiomyopathy. He does have risk factors for coronary disease including hypertension, hyperlipidemia as well as polysubstance abuse, additionally is also entirely possible this is a nonischemic cardiomyopathy given his polysubstance abuse, and hypertension. Past Medical History:   has a past medical history of Back pain, Depression, Erectile dysfunction, Hypertension, IFG (impaired fasting glucose), Kidney stone, and Obesity. Surgical History:   has a past surgical history that includes Inguinal hernia repair; Kidney stone surgery (Left, 1996); back surgery (07/01/2016); and EXCISION / BIOPSY SKIN LESION OF TRUNK (N/A, 8/23/2019). Social History:   reports that he quit smoking about 29 years ago. His smoking use included cigarettes. He has a 15.00 pack-year smoking history.  He has never used smokeless tobacco. He reports current alcohol use. He reports current drug use. Drugs: Other-see comments and Cocaine. Family History:  No family history of premature coronary artery disease, aortic disease, or valve disease. Home Medications:  Were reviewed and are listed in nursing record. and/or listed below  Prior to Admission medications    Medication Sig Start Date End Date Taking?  Authorizing Provider   furosemide (LASIX) 20 MG tablet Take 1 tablet by mouth 2 times daily for 10 days 11/27/22 12/7/22  Qasim Del Rosario MD        Current Medications:  Current Facility-Administered Medications   Medication Dose Route Frequency Provider Last Rate Last Admin    predniSONE (DELTASONE) tablet 40 mg  40 mg Oral Daily Quan Hurt MD   40 mg at 11/29/22 0651    guaiFENesin-dextromethorphan (ROBITUSSIN DM) 100-10 MG/5ML syrup 5 mL  5 mL Oral Q4H PRN Quan Hurt MD        furosemide (LASIX) injection 40 mg  40 mg IntraVENous BID Riley Ruth MD        valsartan (DIOVAN) tablet 40 mg  40 mg Oral Daily Riley Ruth MD        sodium chloride flush 0.9 % injection 5-40 mL  5-40 mL IntraVENous 2 times per day Quan Hurt MD   10 mL at 11/29/22 0916    sodium chloride flush 0.9 % injection 5-40 mL  5-40 mL IntraVENous PRN Quan Hurt MD        0.9 % sodium chloride infusion   IntraVENous PRN Quan Hurt MD        ondansetron (ZOFRAN-ODT) disintegrating tablet 4 mg  4 mg Oral Q8H PRN Quan Hurt MD        Or    ondansetron [facility]) injection 4 mg  4 mg IntraVENous Q6H PRN Quan Hurt MD        polyethylene glycol [facility]) packet 17 g  17 g Oral Daily PRN Quan Hurt MD        acetaminophen (TYLENOL) tablet 650 mg  650 mg Oral Q6H PRN Quan Hurt MD        Or    acetaminophen (TYLENOL) suppository 650 mg  650 mg Rectal Q6H PRN Quan Hurt MD        perflutren lipid microspheres (DEFINITY) injection 1.5 mL  1.5 mL IntraVENous ONCE PRN Harika Abreu MD        enoxaparin Sodium (LOVENOX) injection 30 mg  30 mg SubCUTAneous BID Andrade Villagomez MD   30 mg at 11/29/22 7491     Current Outpatient Medications   Medication Sig Dispense Refill    furosemide (LASIX) 20 MG tablet Take 1 tablet by mouth 2 times daily for 10 days 20 tablet 0        Allergies:  Patient has no known allergies. Review of Systems:   Positive for shortness of breath  All other systems reviewed and negative    Physical Examination:    Vitals:    11/29/22 0700   BP: 123/84   Pulse: (!) 103   Resp: 17   Temp:    SpO2: 95%      Weight: 261 lb 3.9 oz (118.5 kg)       Physical Exam  Vitals and nursing note reviewed. Constitutional:       Appearance: Normal appearance. HENT:      Head: Normocephalic and atraumatic. Mouth/Throat:      Mouth: Mucous membranes are moist.   Eyes:      Pupils: Pupils are equal, round, and reactive to light. Cardiovascular:      Rate and Rhythm: Normal rate and regular rhythm. Heart sounds: No murmur heard. Pulmonary:      Breath sounds: Rales present. Abdominal:      General: There is distension. Musculoskeletal:      Right lower leg: Edema present. Left lower leg: Edema present. Skin:     General: Skin is warm and dry. Neurological:      General: No focal deficit present. Mental Status: He is alert and oriented to person, place, and time.    Psychiatric:         Mood and Affect: Mood normal.         Behavior: Behavior normal.        Labs  CBC:   Lab Results   Component Value Date/Time    WBC 8.2 11/29/2022 06:04 AM    RBC 4.65 11/29/2022 06:04 AM    HGB 13.9 11/29/2022 06:04 AM    HCT 40.9 11/29/2022 06:04 AM    MCV 88.1 11/29/2022 06:04 AM    RDW 13.7 11/29/2022 06:04 AM     11/29/2022 06:04 AM     CMP:    Lab Results   Component Value Date/Time     11/29/2022 06:04 AM    K 4.4 11/29/2022 06:04 AM    K 4.7 11/27/2022 06:57 PM     11/29/2022 06:04 AM    CO2 29 11/29/2022 06:04 AM    BUN 24 11/29/2022 06:04 AM CREATININE 1.5 11/29/2022 06:04 AM    GFRAA >60 07/16/2021 02:29 AM    AGRATIO 1.3 11/28/2022 09:52 AM    LABGLOM 52 11/29/2022 06:04 AM    GLUCOSE 143 11/29/2022 06:04 AM    PROT 5.9 11/29/2022 06:04 AM    CALCIUM 8.5 11/29/2022 06:04 AM    BILITOT 0.5 11/29/2022 06:04 AM    ALKPHOS 75 11/29/2022 06:04 AM    AST 46 11/29/2022 06:04 AM    ALT 80 11/29/2022 06:04 AM     PT/INR:  No results found for: PTINR  Lab Results   Component Value Date    TROPONINI <0.01 11/28/2022       EKG:  I have reviewed EKG with the following interpretation:  Impression: Sinus tachycardia no acute ST-T changes    Echo: 11/28/22   Left ventricular cavity size is moderately dilated with normal thickness    Left ventricular ejection fraction is visually estimated to be 10-15%    Grade III diastolic dysfunction with elevated LV filling pressures. Normal right ventricular size. Right ventricular systolic function is    moderately reduced. IVC size is dilated (>2.1 cm) and collapses < 50% with respiration    consistent with elevated RA pressure (15 mmHg). A bubble study was performed and fails to show evidence of shunting. The left atrium is moderately dilated. The right atrium is severely dilated. Old notes reviewed  Telemetry reviewed  Ekg personally reviewed  Chest xray personally reviewed  Echo, cath, and   Medications and labs reviewed      Assessment/Plan:  Principal Problem:  Acute decompensated heart failure with reduced EF  Hypertension  Hyperlipidemia  Polysubstance abuse  Possible CKD    Assessment: This is a 57-year-old gentleman with risk factors both for nonischemic and ischemic cardiomyopathy. He presents with acute shortness of breath, decompensated heart failure, and now systolic dysfunction on his transthoracic echo. I do think he is volume up and probably needs to get 48 hours of IV diuretic therapy.   Also given the new cardiomyopathy will require an ischemic evaluation with a left heart cath and possibly right heart cath as well. Up titration of GDMT is difficult given his polysubstance abuse which makes me hesitant to prescribe him beta-blockade. We will go ahead and start him on a small dose of ARB I will see how his status shakes out before at [facility]. Plan:  -Increase Lasix to 40 mg IV twice daily  -Start valsartan 40 mg daily  -No beta-blocker at this moment  -We will tentatively plan for left heart cath and possible right heart cath in 48 hours      ---    I will address the patient's cardiac risk factors and adjusted pharmacologic treatment as needed. In addition, I have reinforced the need for patient directed risk factor modification. Tobacco use was discussed with the patient and educated on the negative effects. I have asked the patient to not utilize these agents. Thank you for allowing to us to participate in the care or Alicia Umanzor. Further evaluation will be based upon the patient's clinical course and testing results. All questions and concerns were addressed to the patient/family. Alternatives to my treatment were discussed. The note was completed using EMR. Every effort was made to ensure accuracy; however, inadvertent computerized transcription errors may be present. Dian Mazariegos MD  Interventional Cardiology  11/29/2022  11:00 AM    [place] [address] [place] [address], [facility] John Gregorio [facility]  Ph: [phone]  Fax: [phone]    NOTE: This report was transcribed using voice recognition software. Every effort was made to ensure accuracy, however, inadvertent computerized transcription errors may be present.

## 2022-11-29 NOTE — PROGRESS NOTES
Hospitalist Progress Note      PCP: Mariana Vela MD    Date of Admission: 11/28/2022    Chief Complaint:   Chief Complaint   Patient presents with    Shortness of Breath     Worsening over the past 2 weeks      Hospital Course:   60 y.o. male with history of hypertension, depression, polysubstance abuse presented emergency room with progressive shortness of breath. .  The patient states that over the past 2 to 3 weeks, he started having congestion, cough ,sore throat and hoarseness. .  His partner also had a cough but recovered within 3 days. Over the past 1 week however, he has been experiencing progressive shortness of breath, orthopnea. .. No leg swelling. He was seen in the ED yesterday , chest x-ray showed cardiomegaly without acute infiltrates,had elevated BNP. .  Treated with Lasix for new onset CHF with plans to follow-up with cardiology outpatient. Tamiko Pritchett He returned to the ED today because his symptoms are persistent. .. Repeat chest x-ray showed cardiomegaly but without infiltrate and proBNP 6800. Tamiko Pritchett He has no peripheral edema    Subjective:     Cocaine + on UDS. Pt states he feels better overall though still having hoarseness in his throat. Plan is for [facility] and possible RHC in next 48 hours with cardiology, appreciate their input.        Medications:  Reviewed    Infusion Medications    sodium chloride       Scheduled Medications    predniSONE  40 mg Oral Daily    sodium chloride flush  5-40 mL IntraVENous 2 times per day    furosemide  40 mg IntraVENous Daily    enoxaparin  30 mg SubCUTAneous BID     PRN Meds: guaiFENesin-dextromethorphan, sodium chloride flush, sodium chloride, ondansetron **OR** ondansetron, polyethylene glycol, acetaminophen **OR** acetaminophen, perflutren lipid microspheres      Intake/Output Summary (Last 24 hours) at 11/29/2022 0836  Last data filed at 11/29/2022 0348  Gross per 24 hour   Intake 482 ml   Output 4555 ml   Net -4073 ml       Physical Exam Performed:    /84 Pulse (!) 103   Temp 97.8 °F (36.6 °C) (Oral)   Resp 17   Ht 5' 10\" (1.778 m)   Wt 261 lb 3.9 oz (118.5 kg)   SpO2 95%   BMI 37.48 kg/m²     General appearance: No apparent distress, appears stated age and cooperative. HEENT: Pupils equal, round, and reactive to light. Conjunctivae/corneas clear. Neck: Supple, with full range of motion. No jugular venous distention. Trachea midline. Respiratory:  Normal respiratory effort. Clear to auscultation, bilaterally without Rales/Wheezes/Rhonchi. Cardiovascular: Regular rate and rhythm with normal S1/S2 without murmurs, rubs or gallops. Abdomen: Soft, non-tender, non-distended with normal bowel sounds. Musculoskeletal: No clubbing, cyanosis or edema bilaterally. Full range of motion without deformity. Skin: Skin color, texture, turgor normal.  No rashes or lesions. Neurologic:  Neurovascularly intact without any focal sensory/motor deficits. Cranial nerves: II-XII intact, grossly non-focal.  Psychiatric: Alert and oriented, thought content appropriate, normal insight  Capillary Refill: Brisk,< 3 seconds   Peripheral Pulses: +2 palpable, equal bilaterally       Labs:   Recent Labs     11/27/22 1857 11/28/22  0952 11/29/22  0604   WBC 7.1 7.0 8.2   HGB 13.3* 13.7 13.9   HCT 39.3* 41.7 40.9    285 271     Recent Labs     11/27/22 1857 11/28/22  0952 11/29/22  0604    139 143   K 4.7 4.6 4.4    101 104   CO2 25 24 29   BUN 24* 22* 24*   CREATININE 1.3 1.5* 1.5*   CALCIUM 8.6 8.4 8.5     Recent Labs     11/27/22  1857 11/28/22  0952   AST 97* 53*   ALT 93* 83*   BILITOT 0.9 0.3   ALKPHOS 78 80     No results for input(s): INR in the last 72 hours.   Recent Labs     11/28/22  0952 11/28/22  1608 11/28/22  1810   TROPONINI <0.01 <0.01  <0.01 <0.01       Urinalysis:      Lab Results   Component Value Date/Time    BLOODU neg 06/20/2019 11:31 AM    SPECGRAV 1.025 06/20/2019 11:31 AM    GLUCOSEU neg 06/20/2019 11:31 AM       Radiology:  XR CHEST (2 VW)   Final Result   Clear lungs. Assessment/Plan:    Active Hospital Problems    Diagnosis     Cardiomyopathy [facility]) [I42.9]      Priority: Medium     New onset congestive heart failure. Reports exertional dyspnea, orthopnea  - Chest x-ray shows significant cardiomegaly although without acute infiltrates  - proBNP is elevated at 6881  - IV lasix BID  - Valsartan started  - BB on hold at this point given hx of polysubstance abuse  - CHF nurse consult  - Strict I's and O's   - Daily weights  -TTE on 11/28/2022 revealed: LV EF estimated to be 10 to 15%. Grade 3 diastolic dysfunction with elevated LV filling pressures. Right ventricular systolic function is moderately reduced. Elevated RA pressure. Moderately dilated LA. Severely dilated RA.  - Tele monitoring   - Consult cardiology, appreciate their input - plan for LHC/RHC in next 48 hours     Laryngitis-persistent hoarseness with recent viral URI--- symptoms present for 2 to 3 weeks--will order short course of steroids, if hoarseness persists, will need follow-up with ENT outpatient  - Strep negative  - Added saline spray for congestion      Polysubstance abuse--- endorses cocaine abuse--UDS also positive for THC and opiates--- was previously on Suboxone--drug rehab recommended     History of opiate dependence-in remission--was on Suboxone in the past     Essential hypertension--previously on amlodipine/HCTZ--has not followed up with PCP in 2 years     CKD stage--Baseline appears to be 1.3-1.5 over the past 2 years--monitor renal function     Elevated liver enzymes-could be due to hepatic congestion--monitor LFTs--further work-up recommended if no improvement    Obesity -  With Body mass index is 37.48 kg/m². Complicating assessment and treatment. Placing patient at risk for multiple co-morbidities as well as early death and contributing to the patient's presentation. Counseled on weight loss    DVT Prophylaxis: Lovenox  Diet: ADULT DIET;  Regular; Low Sodium (2 gm); 1800 ml  Code Status: Full Code    PT/OT Eval Status: Not ordered    Dispo -pending clinical improvement    Lynette Berman NP

## 2022-11-29 NOTE — ED NOTES
Report given to Megan Antonio RN to assume care at this time.  Denies further questions      Julianna Joshi, ALAN  11/29/22 9547

## 2022-11-30 VITALS
WEIGHT: 246.03 LBS | HEART RATE: 100 BPM | OXYGEN SATURATION: 95 % | RESPIRATION RATE: 20 BRPM | TEMPERATURE: 97.8 F | SYSTOLIC BLOOD PRESSURE: 112 MMHG | DIASTOLIC BLOOD PRESSURE: 71 MMHG | BODY MASS INDEX: 35.22 KG/M2 | HEIGHT: 70 IN

## 2022-11-30 LAB
ANION GAP SERPL CALCULATED.3IONS-SCNC: 12 MMOL/L (ref 3–16)
BUN BLDV-MCNC: 19 MG/DL (ref 7–20)
CALCIUM SERPL-MCNC: 8.5 MG/DL (ref 8.3–10.6)
CHLORIDE BLD-SCNC: 97 MMOL/L (ref 99–110)
CO2: 31 MMOL/L (ref 21–32)
CREAT SERPL-MCNC: 1.5 MG/DL (ref 0.8–1.3)
GFR SERPL CREATININE-BSD FRML MDRD: 52 ML/MIN/{1.73_M2}
GLUCOSE BLD-MCNC: 136 MG/DL (ref 70–99)
LV EF: 28 %
LVEF MODALITY: NORMAL
MAGNESIUM: 1.9 MG/DL (ref 1.8–2.4)
POTASSIUM SERPL-SCNC: 3.8 MMOL/L (ref 3.5–5.1)
SODIUM BLD-SCNC: 140 MMOL/L (ref 136–145)

## 2022-11-30 PROCEDURE — A9502 TC99M TETROFOSMIN: HCPCS | Performed by: FAMILY MEDICINE

## 2022-11-30 PROCEDURE — 6360000002 HC RX W HCPCS: Performed by: HOSPITALIST

## 2022-11-30 PROCEDURE — 3430000000 HC RX DIAGNOSTIC RADIOPHARMACEUTICAL

## 2022-11-30 PROCEDURE — 3430000000 HC RX DIAGNOSTIC RADIOPHARMACEUTICAL: Performed by: STUDENT IN AN ORGANIZED HEALTH CARE EDUCATION/TRAINING PROGRAM

## 2022-11-30 PROCEDURE — A9502 TC99M TETROFOSMIN: HCPCS | Performed by: STUDENT IN AN ORGANIZED HEALTH CARE EDUCATION/TRAINING PROGRAM

## 2022-11-30 PROCEDURE — 6370000000 HC RX 637 (ALT 250 FOR IP): Performed by: STUDENT IN AN ORGANIZED HEALTH CARE EDUCATION/TRAINING PROGRAM

## 2022-11-30 PROCEDURE — 78452 HT MUSCLE IMAGE SPECT MULT: CPT

## 2022-11-30 PROCEDURE — 6370000000 HC RX 637 (ALT 250 FOR IP): Performed by: NURSE PRACTITIONER

## 2022-11-30 PROCEDURE — 93017 CV STRESS TEST TRACING ONLY: CPT

## 2022-11-30 PROCEDURE — 83735 ASSAY OF MAGNESIUM: CPT

## 2022-11-30 PROCEDURE — 36415 COLL VENOUS BLD VENIPUNCTURE: CPT

## 2022-11-30 PROCEDURE — A9502 TC99M TETROFOSMIN: HCPCS

## 2022-11-30 PROCEDURE — 6360000002 HC RX W HCPCS: Performed by: STUDENT IN AN ORGANIZED HEALTH CARE EDUCATION/TRAINING PROGRAM

## 2022-11-30 PROCEDURE — 3430000000 HC RX DIAGNOSTIC RADIOPHARMACEUTICAL: Performed by: FAMILY MEDICINE

## 2022-11-30 PROCEDURE — 99232 SBSQ HOSP IP/OBS MODERATE 35: CPT | Performed by: NURSE PRACTITIONER

## 2022-11-30 PROCEDURE — 2580000003 HC RX 258: Performed by: HOSPITALIST

## 2022-11-30 PROCEDURE — 6370000000 HC RX 637 (ALT 250 FOR IP): Performed by: HOSPITALIST

## 2022-11-30 PROCEDURE — 80048 BASIC METABOLIC PNL TOTAL CA: CPT

## 2022-11-30 RX ORDER — FUROSEMIDE 40 MG/1
40 TABLET ORAL DAILY
Qty: 30 TABLET | Refills: 3 | Status: SHIPPED | OUTPATIENT
Start: 2022-12-01 | End: 2023-01-26

## 2022-11-30 RX ORDER — SPIRONOLACTONE 25 MG/1
25 TABLET ORAL DAILY
Status: DISCONTINUED | OUTPATIENT
Start: 2022-11-30 | End: 2022-12-01 | Stop reason: HOSPADM

## 2022-11-30 RX ORDER — FUROSEMIDE 40 MG/1
40 TABLET ORAL DAILY
Status: DISCONTINUED | OUTPATIENT
Start: 2022-12-01 | End: 2022-12-01 | Stop reason: HOSPADM

## 2022-11-30 RX ORDER — PREDNISONE 20 MG/1
40 TABLET ORAL DAILY
Qty: 6 TABLET | Refills: 0 | Status: SHIPPED | OUTPATIENT
Start: 2022-12-01 | End: 2022-11-30 | Stop reason: SDUPTHER

## 2022-11-30 RX ORDER — VALSARTAN 40 MG/1
40 TABLET ORAL DAILY
Qty: 30 TABLET | Refills: 3 | Status: SHIPPED | OUTPATIENT
Start: 2022-12-01 | End: 2023-01-26

## 2022-11-30 RX ORDER — BENZONATATE 100 MG/1
100 CAPSULE ORAL 3 TIMES DAILY PRN
Status: DISCONTINUED | OUTPATIENT
Start: 2022-11-30 | End: 2022-12-01 | Stop reason: HOSPADM

## 2022-11-30 RX ORDER — VALSARTAN 40 MG/1
40 TABLET ORAL DAILY
Qty: 30 TABLET | Refills: 3 | Status: SHIPPED | OUTPATIENT
Start: 2022-12-01 | End: 2022-11-30 | Stop reason: SDUPTHER

## 2022-11-30 RX ORDER — SPIRONOLACTONE 25 MG/1
25 TABLET ORAL DAILY
Qty: 30 TABLET | Refills: 3 | Status: SHIPPED | OUTPATIENT
Start: 2022-11-30 | End: 2023-01-26

## 2022-11-30 RX ORDER — PREDNISONE 20 MG/1
40 TABLET ORAL DAILY
Qty: 6 TABLET | Refills: 0 | Status: SHIPPED | OUTPATIENT
Start: 2022-12-01 | End: 2022-12-04

## 2022-11-30 RX ORDER — FUROSEMIDE 40 MG/1
40 TABLET ORAL DAILY
Qty: 30 TABLET | Refills: 3 | Status: SHIPPED | OUTPATIENT
Start: 2022-12-01 | End: 2022-11-30 | Stop reason: SDUPTHER

## 2022-11-30 RX ORDER — SPIRONOLACTONE 25 MG/1
25 TABLET ORAL DAILY
Qty: 30 TABLET | Refills: 3 | Status: SHIPPED | OUTPATIENT
Start: 2022-11-30 | End: 2022-11-30 | Stop reason: SDUPTHER

## 2022-11-30 RX ADMIN — Medication 1 LOZENGE: at 00:16

## 2022-11-30 RX ADMIN — ACETAMINOPHEN 650 MG: 325 TABLET ORAL at 15:48

## 2022-11-30 RX ADMIN — FUROSEMIDE 40 MG: 10 INJECTION, SOLUTION INTRAMUSCULAR; INTRAVENOUS at 09:58

## 2022-11-30 RX ADMIN — BENZONATATE 100 MG: 100 CAPSULE ORAL at 04:44

## 2022-11-30 RX ADMIN — Medication 1 LOZENGE: at 04:44

## 2022-11-30 RX ADMIN — ACETAMINOPHEN 650 MG: 325 TABLET ORAL at 08:37

## 2022-11-30 RX ADMIN — PREDNISONE 40 MG: 20 TABLET ORAL at 08:37

## 2022-11-30 RX ADMIN — GUAIFENESIN SYRUP AND DEXTROMETHORPHAN 5 ML: 100; 10 SYRUP ORAL at 04:44

## 2022-11-30 RX ADMIN — Medication 6 MG: at 00:14

## 2022-11-30 RX ADMIN — REGADENOSON 0.4 MG: 0.08 INJECTION, SOLUTION INTRAVENOUS at 11:40

## 2022-11-30 RX ADMIN — TETROFOSMIN 30 MILLICURIE: 1.38 INJECTION, POWDER, LYOPHILIZED, FOR SOLUTION INTRAVENOUS at 11:45

## 2022-11-30 RX ADMIN — Medication 1 SPRAY: at 00:21

## 2022-11-30 RX ADMIN — Medication 10 ML: at 09:58

## 2022-11-30 RX ADMIN — VALSARTAN 40 MG: 80 TABLET ORAL at 08:37

## 2022-11-30 RX ADMIN — ENOXAPARIN SODIUM 30 MG: 100 INJECTION SUBCUTANEOUS at 08:39

## 2022-11-30 RX ADMIN — TETROFOSMIN 10 MILLICURIE: 1.38 INJECTION, POWDER, LYOPHILIZED, FOR SOLUTION INTRAVENOUS at 10:21

## 2022-11-30 RX ADMIN — SPIRONOLACTONE 25 MG: 25 TABLET ORAL at 15:47

## 2022-11-30 RX ADMIN — GUAIFENESIN SYRUP AND DEXTROMETHORPHAN 5 ML: 100; 10 SYRUP ORAL at 10:03

## 2022-11-30 ASSESSMENT — PAIN SCALES - GENERAL
PAINLEVEL_OUTOF10: 3

## 2022-11-30 ASSESSMENT — PAIN DESCRIPTION - DESCRIPTORS
DESCRIPTORS: SORE
DESCRIPTORS: ACHING
DESCRIPTORS: SORE

## 2022-11-30 ASSESSMENT — PAIN DESCRIPTION - LOCATION
LOCATION: HEAD
LOCATION: THROAT
LOCATION: THROAT

## 2022-11-30 ASSESSMENT — PAIN DESCRIPTION - ORIENTATION
ORIENTATION: MID
ORIENTATION: MID

## 2022-11-30 NOTE — PLAN OF CARE
Problem: Discharge Planning  Goal: Discharge to home or other facility with appropriate resources  11/30/2022 1008 by Heladio Jama RN  Outcome: Progressing     Problem: ABCDS Injury Assessment  Goal: Absence of physical injury  11/30/2022 1008 by Heladio Jama RN  Outcome: Progressing     Problem: Safety - Adult  Goal: Free from fall injury  11/30/2022 1008 by Heladio Jama RN  Outcome: Progressing     Problem: Cardiovascular - Adult  Goal: Maintains optimal cardiac output and hemodynamic stability  11/30/2022 1008 by Heladio Jama RN  Outcome: Progressing     Problem: Cardiovascular - Adult  Goal: Absence of cardiac dysrhythmias or at baseline  11/30/2022 1008 by Heladio Jama RN  Outcome: Progressing     Problem: Cardiovascular - Adult  Goal: Absence of cardiac dysrhythmias or at baseline  11/30/2022 1008 by Heladio Jama RN  Outcome: Progressing     Problem: Metabolic/Fluid and Electrolytes - Adult  Goal: Electrolytes maintained within normal limits  11/30/2022 1008 by Heladio Jama RN  Outcome: Progressing     Problem: Metabolic/Fluid and Electrolytes - Adult  Goal: Hemodynamic stability and optimal renal function maintained  11/30/2022 1008 by Heladio Jama RN  Outcome: Progressing     Problem: Metabolic/Fluid and Electrolytes - Adult  Goal: Electrolytes maintained within normal limits  11/30/2022 1008 by Heladio Jama RN  Outcome: Progressing     Problem: Metabolic/Fluid and Electrolytes - Adult  Goal: Hemodynamic stability and optimal renal function maintained  11/30/2022 1008 by Heladio Jama RN  Outcome: Progressing     Problem: Pain  Goal: Verbalizes/displays adequate comfort level or baseline comfort level  11/30/2022 1008 by Heladio Jama RN  Outcome: Progressing

## 2022-11-30 NOTE — PROGRESS NOTES
Patient discharged per MD. Discharge paperwork AVS reviewed with patient and partner. Including need to f/u with PCP, nephrology and Cardiology. Labs work ordered on 12/7. Patient provided voucher from [facility] for medication. Resource information ( [address] [place] [address], Gisella Dangelo's card) from  given to patient. Prescriptions given to patient. Patient/Partner verbalized an understanding. All questions answered, no concerns voiced. IV removed, tolerated well. Patient chose to leave unit ambulatory with partner.

## 2022-11-30 NOTE — DISCHARGE SUMMARY
Hospital Medicine Discharge Summary    Patient ID: Yordy Trinidad      Patient's PCP: Laura Alexandra MD    Admit Date: 11/28/2022     Discharge Date:   11/30/22    Admitting Physician: Jd Calixto MD     Discharge Physician: Tara Stanton. Dominique Marcial - NP     Discharge Diagnoses: Active Hospital Problems    Diagnosis     Cardiomyopathy [facility]) [I42.9]      Priority: Medium       The patient was seen and examined on day of discharge and this discharge summary is in conjunction with any daily progress note from day of discharge. Hospital Course:   60 y.o. male with history of hypertension, depression, polysubstance abuse presented emergency room with progressive shortness of breath. .  The patient states that over the past 2 to 3 weeks, he started having congestion, cough ,sore throat and hoarseness. .  His partner also had a cough but recovered within 3 days. Over the past 1 week however, he has been experiencing progressive shortness of breath, orthopnea. .. No leg swelling. He was seen in the ED yesterday , chest x-ray showed cardiomegaly without acute infiltrates,had elevated BNP. .  Treated with Lasix for new onset CHF with plans to follow-up with cardiology outpatient. Eusebia Goldsmith He returned to the ED today because his symptoms are persistent. .. Repeat chest x-ray showed cardiomegaly but without infiltrate and proBNP 6800. Eusebia Goldsmith He has no peripheral edema     New onset congestive heart failure. Reports exertional dyspnea, orthopnea  - Chest x-ray shows significant cardiomegaly although without acute infiltrates  - proBNP is elevated at 6881  - IV lasix BID transitioned to oral lasix, add spironolactone - prescriptions sent per cards  - Valsartan started  - BB on hold at this point given hx of polysubstance abuse  - Daily weights  -TTE on 11/28/2022 revealed: LV EF estimated to be 10 to 15%. Grade 3 diastolic dysfunction with elevated LV filling pressures.   Right ventricular systolic function is moderately reduced. Elevated RA pressure. Moderately dilated LA. Severely dilated RA.  - Consult cardiology, appreciate their input - noted stress test results  - Dr. Joseph Greene noted stress test results, he will follow up with the patient in the office  - Patient is medically stable for dc    Laryngitis-persistent hoarseness with recent viral URI--- symptoms present for 2 to 3 weeks--will order short course of steroids, if hoarseness persists, will need follow-up with ENT outpatient  - Patient feels this has improved, will finish out 5 day course of steroids  - Strep negative  - Added saline spray for congestion      Polysubstance abuse--- endorses cocaine abuse--drug rehab recommended     History of opiate dependence-in remission--was on Suboxone in the past     Essential hypertension--previously on amlodipine/HCTZ--has not followed up with PCP in 2 years - advised follow up next week     CKD--Baseline appears to be 1.3-1.5 over the past 2 years--monitor renal function  - Spoke with Dr. Marta Miller who will have patient follow up with him outpatient       Obesity -  With Body mass index is 35.3 kg/m². Complicating assessment and treatment. Placing patient at risk for multiple co-morbidities as well as early death and contributing to the patient's presentation. Counseled on weight loss      Physical Exam Performed:     /71   Pulse 100   Temp 97.8 °F (36.6 °C) (Oral)   Resp 20   Ht 5' 10\" (1.778 m)   Wt 246 lb 0.5 oz (111.6 kg)   SpO2 95%   BMI 35.30 kg/m²       General appearance:  No apparent distress, appears stated age and cooperative. HEENT:  Normal cephalic, atraumatic without obvious deformity. Pupils equal, round, and reactive to light. Extra ocular muscles intact. Conjunctivae/corneas clear. Neck: Supple, with full range of motion. No jugular venous distention. Trachea midline. Respiratory:  Normal respiratory effort. Clear to auscultation, bilaterally without Rales/Wheezes/Rhonchi.   Cardiovascular: Regular rate and rhythm with normal S1/S2 without murmurs, rubs or gallops. Abdomen: Soft, non-tender, non-distended with normal bowel sounds. Musculoskeletal:  No clubbing, cyanosis or edema bilaterally. Full range of motion without deformity. Skin: Skin color, texture, turgor normal.  No rashes or lesions. Neurologic:  Neurovascularly intact without any focal sensory/motor deficits. Cranial nerves: II-XII intact, grossly non-focal.  Psychiatric:  Alert and oriented, thought content appropriate, normal insight  Capillary Refill: Brisk,< 3 seconds   Peripheral Pulses: +2 palpable, equal bilaterally       Labs: For convenience and continuity at follow-up the following most recent labs are provided:      CBC:    Lab Results   Component Value Date/Time    WBC 8.2 11/29/2022 06:04 AM    HGB 13.9 11/29/2022 06:04 AM    HCT 40.9 11/29/2022 06:04 AM     11/29/2022 06:04 AM       Renal:    Lab Results   Component Value Date/Time     11/30/2022 05:23 AM    K 3.8 11/30/2022 05:23 AM    K 4.7 11/27/2022 06:57 PM    CL 97 11/30/2022 05:23 AM    CO2 31 11/30/2022 05:23 AM    BUN 19 11/30/2022 05:23 AM    CREATININE 1.5 11/30/2022 05:23 AM    CALCIUM 8.5 11/30/2022 05:23 AM         Significant Diagnostic Studies    Radiology:   NM MYOCARDIAL SPECT REST EXERCISE OR RX   Final Result      XR CHEST (2 VW)   Final Result   Clear lungs.                 Consults:     IP CONSULT TO HOSPITALIST  IP CONSULT TO HEART FAILURE NURSE/COORDINATOR  IP CONSULT TO DIETITIAN  IP CONSULT TO CARDIOLOGY  IP CONSULT TO CARDIAC REHAB  IP CONSULT TO NEPHROLOGY    Disposition:  Home    Condition at Discharge: Stable    Discharge Instructions/Follow-up:      Follow up with PCP in 1 week    Follow up with cardiology as scheduled    Follow up with nephrology    Code Status:  Full Code     Activity: activity as tolerated    Diet: cardiac diet      Discharge Medications:     Current Discharge Medication List             Details   valsartan (DIOVAN) 40 MG tablet Take 1 tablet by mouth daily  Qty: 30 tablet, Refills: 3      spironolactone (ALDACTONE) 25 MG tablet Take 1 tablet by mouth daily  Qty: 30 tablet, Refills: 3      predniSONE (DELTASONE) 20 MG tablet Take 2 tablets by mouth daily for 3 doses  Qty: 6 tablet, Refills: 0                Details   furosemide (LASIX) 40 MG tablet Take 1 tablet by mouth daily  Qty: 30 tablet, Refills: 3             Time Spent on discharge is more than 1 hour in the examination, evaluation, counseling and review of medications and discharge plan. Signed:    Latrice Hutchins. Yelitza Berman NP   11/30/2022      Thank you Ananda Rodrigues MD for the opportunity to be involved in this patient's care. If you have any questions or concerns please feel free to contact me at [phone].

## 2022-11-30 NOTE — CARE COORDINATION
Discharge Needs:  Patient needs assistance with RX's-    provided voucher for [employer], patient able to get RX for free. Patient also in need of PCP for follow up medical care. Gave patient information on contacting [facility], for a new patient appointment. Can see PCP for $15 a visit. Patient given SW card and will call SW back if he is unable to get his medications or in with [facility]..

## 2022-11-30 NOTE — PLAN OF CARE
Problem: Discharge Planning  Goal: Discharge to home or other facility with appropriate resources  11/30/2022 0334 by Uri Stone RN  Outcome: Progressing     Problem: ABCDS Injury Assessment  Goal: Absence of physical injury  11/30/2022 0334 by Uri Stone RN  Outcome: Progressing     Problem: Safety - Adult  Goal: Free from fall injury  11/30/2022 0334 by Uri Stone RN  Outcome: Progressing     Problem: Cardiovascular - Adult  Goal: Maintains optimal cardiac output and hemodynamic stability  11/30/2022 0334 by Uri Stone RN  Outcome: Progressing     Problem: Cardiovascular - Adult  Goal: Absence of cardiac dysrhythmias or at baseline  11/30/2022 0334 by Uri Stone RN  Outcome: Progressing     Problem: Metabolic/Fluid and Electrolytes - Adult  Goal: Electrolytes maintained within normal limits  11/30/2022 0334 by Uri Stone RN  Outcome: Progressing     Problem: Metabolic/Fluid and Electrolytes - Adult  Goal: Hemodynamic stability and optimal renal function maintained  11/30/2022 0334 by Uri Stone RN  Outcome: Progressing     Problem: Pain  Goal: Verbalizes/displays adequate comfort level or baseline comfort level  Outcome: Progressing

## 2022-11-30 NOTE — PROGRESS NOTES
Patient admitted to room 4114. Patient oriented to room and call light system. VSS. Patient resting in bed call light within reach.

## 2022-11-30 NOTE — PROGRESS NOTES
[address]   Daily Progress Note      Admit Date:  11/28/2022    CC: SOB    HPI:   Dharmesh Echeverria is a 60 y.o. male with PMH depression, polysubstance abuse, HTN, HLP. No known cardiac hx. Adm to [facility] with SOB and fatigue. ECHO revealed systolic CM. Cardiology, Dr. Socorro Ervin, consulted. Appeared hypervolemic - started on IV lasix. SOB resolved. Ambulating in room. Eating lunch- good appetite. C/O frequent cough and sinus pressure. Denies CP, LH, dizziness, palpitations, syncope. NM stress completed, result pending. Review of Systems:   General: Denies fever, chills, fatigue, weakness  Skin: Denies skin changes, rash, itching, lesions.   HEENT: Denies headache, dizziness, vision changes, nosebleeds, sore throat, nasal drainage  RESP: Denies cough, sputum, dyspnea, wheeze, snoring  CARD: Denies palpitations,  murmur  GI:Denies nausea, vomiting, heartburn, loss of appetite, change in bowels  : Denies frequency, pain, incontinence, polyuria  VASC: Denies claudication, leg cramps, clots  MUSC/SKEL: Denies pain, stiffness, arthritis  PSYCH: Denies anxiety, depression, stress  NEURO: Denies numbness, tingling, weakness,change in mood or memory  HEME: Denies abn bruising, bleeding, anemia  ENDO: Denies intolerance to heat, cold, excessive thirst or hunger, hx thyroid disease    Objective:   /77   Pulse (!) 113   Temp 98.3 °F (36.8 °C) (Oral)   Resp 20   Ht 5' 10\" (1.778 m)   Wt 246 lb 0.5 oz (111.6 kg)   SpO2 95%   BMI 35.30 kg/m²         Intake/Output Summary (Last 24 hours) at 11/30/2022 1000  Last data filed at 11/30/2022 0326  Gross per 24 hour   Intake 240 ml   Output 1800 ml   Net -1560 ml     I/O since adm: Neg 0884    WEIGHT:Admit Weight: 262 lb 12.6 oz (119.2 kg)         Today  Weight: 246 lb 0.5 oz (111.6 kg)   DRY WEIGHT:  Wt Readings from Last 3 Encounters:   11/30/22 246 lb 0.5 oz (111.6 kg)   11/27/22 264 lb 15.9 oz (120.2 kg)   07/16/21 224 lb 3.3 oz (101.7 kg) Physical Exam:  GEN: Appears well, no acute distress  SKIN: Pink, warm, dry. HEENT: PERRLA. No adenopathy. LUNG: AP diameter normal. Clear bilateral. No wheeze, rales, or ronchi. Respiratory effort normal.  HEART: S1S2 A/R. No JVD. No carotid bruit. No murmur, rub or gallop. ABD: Soft, nontender. +BS X 4 quads. No hepatomegaly. EXT: Radial and pedal pulses 2+ and symmetric. Without varicosities. No edema. MUSCSKEL: Good ROM X4 extremities. No deformity. NEURO: A/O X3. Calm and cooperative. Telemetry: NSR    Medications:    predniSONE  40 mg Oral Daily    furosemide  40 mg IntraVENous BID    valsartan  40 mg Oral Daily    sodium chloride flush  5-40 mL IntraVENous 2 times per day    enoxaparin  30 mg SubCUTAneous BID      sodium chloride       benzonatate, guaiFENesin-dextromethorphan, melatonin, sodium chloride, Benzocaine-Menthol, phenol, sodium chloride flush, sodium chloride, ondansetron **OR** ondansetron, polyethylene glycol, acetaminophen **OR** acetaminophen, perflutren lipid microspheres    Lab Data: I have reviewed all labs below today.    CBC:   Recent Labs     11/27/22  1857 11/28/22  0952 11/29/22  0604   WBC 7.1 7.0 8.2   HGB 13.3* 13.7 13.9   HCT 39.3* 41.7 40.9   MCV 86.0 89.5 88.1    285 271     BMP:   Recent Labs     11/28/22  0952 11/29/22  0604 11/30/22  0523    143 140   K 4.6 4.4 3.8    104 97*   CO2 24 29 31   BUN 22* 24* 19   CREATININE 1.5* 1.5* 1.5*     GLUCOSE:   Recent Labs     11/28/22  0952 11/29/22  0604 11/30/22  0523   GLUCOSE 140* 143* 136*     LIVER PROFILE:   Lab Results   Component Value Date/Time    AST 46 11/29/2022 06:04 AM    ALT 80 11/29/2022 06:04 AM    LABALBU 3.2 11/29/2022 06:04 AM    BILIDIR <0.2 11/29/2022 06:04 AM    BILITOT 0.5 11/29/2022 06:04 AM    ALKPHOS 75 11/29/2022 06:04 AM     PT/INR: No results found for: PROTIME, INR  APTT: No results found for: APTT  Pro-BNP:    Lab Results   Component Value Date/Time    PROBNP 3,277 11/28/2022 09:52 AM    PROBNP 5,910 11/27/2022 06:57 PM     Parameters:   > 450 pg/mL under age 48  > 900 pg/mL ages 54-65  > 1800 pg/mL over age 76    ENZYMES:  Lab Results   Component Value Date/Time    TROPONINI <0.01 11/28/2022 06:10 PM    TROPONINI <0.01 11/28/2022 04:08 PM    TROPONINI <0.01 11/28/2022 04:08 PM     FASTING LIPID PANEL:  Lab Results   Component Value Date/Time    CHOL 121 11/29/2022 06:04 AM    HDL 44 11/29/2022 06:04 AM    LDLCALC 61 11/29/2022 06:04 AM    TRIG 81 11/29/2022 06:04 AM       Diagnostics:    EKG:   Sinus tachycardiaNonspecific T wave abnormalityAbnormal ECGWhen compared with ECG of 27-NOV-2022 20:07, (unconfirmed)No significant change was foundConfirmed by [facility] PAYTON MD ([id number]) on 11/28/2022 2:39:52 PM    ECHO:  Echo: 11/28/22   Left ventricular cavity size is moderately dilated with normal thickness    Left ventricular ejection fraction is visually estimated to be 10-15%    Grade III diastolic dysfunction with elevated LV filling pressures. Normal right ventricular size. Right ventricular systolic function is    moderately reduced. IVC size is dilated (>2.1 cm) and collapses < 50% with respiration    consistent with elevated RA pressure (15 mmHg). A bubble study was performed and fails to show evidence of shunting. The left atrium is moderately dilated. The right atrium is severely dilated. NM Stress: Pending    Assessment/Plan:    1.) Acute combined systolic and diastolic HF, LVEF 97-92%: ICM vc NICM, NM Stress pending. Appears euvolemic. SOB resolved, no edema.    NYHA Class II   Stage C  Diuretic: Change IV lasix to 40mg po daily    Indicated for EF </=40%:   Beta Blocker (evidence based)- hold, hx cocaine abuse up until admission, consider adding as OP  ACEi/ARB/ARNI- valsartan- consider changing to entresto as OP, compliance may be an issue  SGLT2 Inhibitor- add as OP if tolerating other GDMT  Aldosterone Antagonist (BNP within 1 week of DC if new or dose changed)- add spironolactone    Cardiac Rehab for EF <35%: OP  ICD counseling: Repeat ECHO 3 mos GDMT, consider ICD if EF remains low. ACC Guidelines [place] rec ICD for primary prevention of sudden cardiac death for dilated NICM or IHD at least 40 days pos MI with LVEF</= 35% and Class II/IIIsymptoms on chronic GDMT who have reasonable expectation of meaningful survival of 1 yr or more. CRT rec for LVEF </= 35%, NSR and LBBB with QRS duration 150ms or more. (Further Class II rec available in ACC guidelines)  2gm Na diet, daily weight, 64 oz fluid restriction  Avoid NSAIDS and other nephrotoxic meds  [facility] Referral: OP  HF RN referral for education     2.) Hypertension:   Goal BP <130/80. Non pharmacologic interventions include:   -weight loss  -heart healthy low sodium and low fat diet that consist of mostly fruits, vegetables and grains (Dash diet)  -limited amount of alcohol (no more than 1 drink/day for women, 2 drinks/day for men)  -regular physical activity  -no smoking  -stress reduction    If NM stress neg, OK to DC pt later today. He will need 7d Fu with [facility].      Electronically signed by CT Perez CNP on 11/30/2022 at 10:00 AM  \

## 2022-11-30 NOTE — ACP (ADVANCE CARE PLANNING)
Advance Care Planning     Advance Care Planning Activator (Inpatient)  Conversation Note      Date of ACP Conversation: 11/30/2022     Conversation Conducted with: Patient with Decision Making Capacity    ACP Activator: Doretha Licona RN    Health Care Decision Maker:     Current Designated Health Care Decision Maker:     Primary Decision Maker: Dale Ferro Child - [phone]    Secondary Decision Maker: Laura Real - Parent - [phone]    Care Preferences    Ventilation: \"If you were in your present state of health and suddenly became very ill and were unable to breathe on your own, what would your preference be about the use of a ventilator (breathing machine) if it were available to you? \"      Would the patient desire the use of ventilator (breathing machine)?: yes    \"If your health worsens and it becomes clear that your chance of recovery is unlikely, what would your preference be about the use of a ventilator (breathing machine) if it were available to you? \"     Would the patient desire the use of ventilator (breathing machine)?: No      Resuscitation  \"CPR works best to restart the heart when there is a sudden event, like a heart attack, in someone who is otherwise healthy. Unfortunately, CPR does not typically restart the heart for people who have serious health conditions or who are very sick. \"    \"In the event your heart stopped as a result of an underlying serious health condition, would you want attempts to be made to restart your heart (answer \"yes\" for attempt to resuscitate) or would you prefer a natural death (answer \"no\" for do not attempt to resuscitate)? \" yes       [] Yes   [x] No   Educated Patient / Charlene Miah regarding differences between Advance Directives and portable DNR orders.     Length of ACP Conversation in minutes:  5    Conversation Outcomes:  [x] ACP discussion completed  [] Existing advance directive reviewed with patient; no changes to patient's previously recorded wishes  [] New Advance Directive completed  [] Portable Do Not Rescitate prepared for Provider review and signature  [] POLST/POST/MOLST/MOST prepared for Provider review and signature      Follow-up plan:    [] Schedule follow-up conversation to continue planning  [] Referred individual to Provider for additional questions/concerns   [] Advised patient/agent/surrogate to review completed ACP document and update if needed with changes in condition, patient preferences or care setting    [] This note routed to one or more involved healthcare providers

## 2022-11-30 NOTE — PROGRESS NOTES
Hospitalist Progress Note      PCP: Laura Alexandra MD    Date of Admission: 11/28/2022    Chief Complaint:   Chief Complaint   Patient presents with    Shortness of Breath     Worsening over the past 2 weeks      Hospital Course:   60 y.o. male with history of hypertension, depression, polysubstance abuse presented emergency room with progressive shortness of breath. .  The patient states that over the past 2 to 3 weeks, he started having congestion, cough ,sore throat and hoarseness. .  His partner also had a cough but recovered within 3 days. Over the past 1 week however, he has been experiencing progressive shortness of breath, orthopnea. .. No leg swelling. He was seen in the ED yesterday , chest x-ray showed cardiomegaly without acute infiltrates,had elevated BNP. .  Treated with Lasix for new onset CHF with plans to follow-up with cardiology outpatient. Eusebia Goldsmith He returned to the ED today because his symptoms are persistent. .. Repeat chest x-ray showed cardiomegaly but without infiltrate and proBNP 6800. Eusebia Goldsmith He has no peripheral edema    11/29: Cocaine + on UDS. Pt states he feels better overall though still having hoarseness in his throat. Plan is for [facility] and possible [facility] in next 48 hours with cardiology, appreciate their input. Subjective:     Just got back from stress test during my visit. Cr still up to 1.5. Will consult nephrology.       Medications:  Reviewed    Infusion Medications    sodium chloride       Scheduled Medications    predniSONE  40 mg Oral Daily    furosemide  40 mg IntraVENous BID    valsartan  40 mg Oral Daily    sodium chloride flush  5-40 mL IntraVENous 2 times per day    enoxaparin  30 mg SubCUTAneous BID     PRN Meds: benzonatate, guaiFENesin-dextromethorphan, melatonin, sodium chloride, Benzocaine-Menthol, phenol, sodium chloride flush, sodium chloride, ondansetron **OR** ondansetron, polyethylene glycol, acetaminophen **OR** acetaminophen, perflutren lipid microspheres      Intake/Output Summary (Last 24 hours) at 11/30/2022 0830  Last data filed at 11/30/2022 0326  Gross per 24 hour   Intake 240 ml   Output 1800 ml   Net -1560 ml         Physical Exam Performed:    /74   Pulse 96   Temp 98.1 °F (36.7 °C) (Oral)   Resp 18   Ht 5' 10\" (1.778 m)   Wt 246 lb 0.5 oz (111.6 kg)   SpO2 93%   BMI 35.30 kg/m²     General appearance: No apparent distress, appears stated age and cooperative. HEENT: Pupils equal, round, and reactive to light. Conjunctivae/corneas clear. Neck: Supple, with full range of motion. No jugular venous distention. Trachea midline. Respiratory:  Normal respiratory effort. Clear to auscultation, bilaterally without Rales/Wheezes/Rhonchi. Cardiovascular: Regular rate and rhythm with normal S1/S2 without murmurs, rubs or gallops. Abdomen: Soft, non-tender, non-distended with normal bowel sounds. Musculoskeletal: No clubbing, cyanosis or edema bilaterally. Full range of motion without deformity. Skin: Skin color, texture, turgor normal.  No rashes or lesions. Neurologic:  Neurovascularly intact without any focal sensory/motor deficits. Cranial nerves: II-XII intact, grossly non-focal.  Psychiatric: Alert and oriented, thought content appropriate, normal insight  Capillary Refill: Brisk,< 3 seconds   Peripheral Pulses: +2 palpable, equal bilaterally       Labs:   Recent Labs     11/27/22  1857 11/28/22  0952 11/29/22  0604   WBC 7.1 7.0 8.2   HGB 13.3* 13.7 13.9   HCT 39.3* 41.7 40.9    285 271       Recent Labs     11/28/22  0952 11/29/22  0604 11/30/22  0523    143 140   K 4.6 4.4 3.8    104 97*   CO2 24 29 31   BUN 22* 24* 19   CREATININE 1.5* 1.5* 1.5*   CALCIUM 8.4 8.5 8.5       Recent Labs     11/27/22  1857 11/28/22  0952 11/29/22  0604   AST 97* 53* 46*   ALT 93* 83* 80*   BILIDIR  --   --  <0.2   BILITOT 0.9 0.3 0.5   ALKPHOS 78 80 75       No results for input(s): INR in the last 72 hours.   Recent Labs 11/28/22  0952 11/28/22  1608 11/28/22  1810   TROPONINI <0.01 <0.01  <0.01 <0.01         Urinalysis:      Lab Results   Component Value Date/Time    BLOODU neg 06/20/2019 11:31 AM    SPECGRAV 1.025 06/20/2019 11:31 AM    GLUCOSEU neg 06/20/2019 11:31 AM       Radiology:  XR CHEST (2 VW)   Final Result   Clear lungs. Assessment/Plan:    Active Hospital Problems    Diagnosis     Cardiomyopathy [facility]) [I42.9]      Priority: Medium     New onset congestive heart failure. Reports exertional dyspnea, orthopnea  - Chest x-ray shows significant cardiomegaly although without acute infiltrates  - proBNP is elevated at 6881  - IV lasix BID transitioned to oral lasix, add spironolactone  - Valsartan started  - BB on hold at this point given hx of polysubstance abuse  - CHF nurse consult  - Strict I's and O's   - Daily weights  -TTE on 11/28/2022 revealed: LV EF estimated to be 10 to 15%. Grade 3 diastolic dysfunction with elevated LV filling pressures. Right ventricular systolic function is moderately reduced. Elevated RA pressure. Moderately dilated LA.   Severely dilated RA.  - Tele monitoring   - Consult cardiology, appreciate their input - stress test pending     Laryngitis-persistent hoarseness with recent viral URI--- symptoms present for 2 to 3 weeks--will order short course of steroids, if hoarseness persists, will need follow-up with ENT outpatient  - Strep negative  - Added saline spray for congestion      Polysubstance abuse--- endorses cocaine abuse--UDS also positive for THC and opiates--- was previously on Suboxone--drug rehab recommended     History of opiate dependence-in remission--was on Suboxone in the past     Essential hypertension--previously on amlodipine/HCTZ--has not followed up with PCP in 2 years     CKD stage--Baseline appears to be 1.3-1.5 over the past 2 years--monitor renal function     Elevated liver enzymes-could be due to hepatic congestion--monitor LFTs--further work-up recommended if no improvement    Obesity -  With Body mass index is 35.3 kg/m². Complicating assessment and treatment. Placing patient at risk for multiple co-morbidities as well as early death and contributing to the patient's presentation. Counseled on weight loss    DVT Prophylaxis: Lovenox  Diet: ADULT DIET; Regular; Low Sodium (2 gm); 1800 ml  Code Status: Full Code    PT/OT Eval Status: Not ordered    Dispo -pending clinical improvement    Lynette Plaat - NP

## 2022-11-30 NOTE — NURSE NAVIGATOR
Discharge order noted. Pt has had 60+ minutes of heart failure education. He has a follow up appointment scheduled within 7 days .   His GDMT has been addressed  His weight today is 246 lbs

## 2022-11-30 NOTE — CONSULTS
Heart Failure Diet Education:    Per hospital protocol or consult, patient being seen for Heart Failure diet guidelines. Learners: [x]Patient []Family []Caregiver [] Other: ______________  Methods: [x]Verbal Education  [x]Handouts  []Teachback     Patient's Lifestyle Questions:   Is HF a new Diagnosis? [x]Yes []No    Has patient had prior education? [x]Yes-CHF nurse   []No     Frequency of eating out? Not very often    Typical Eating Habits: doesn't use a lot of seasonings or condiments at baseline      Instructed the Patient on:   [x] High/Low Sodium foods    [x] Moderation/portion control  [x] Eating out  [x] Fluid Restriction    [] Label reading  [] Carb Counting   [] Other:      Educational Materials Provided:   [x] Nutrition Care Manual (NCM) Heart Failure Nutrition Therapy   [] NCM Sodium (Salt) Content of Foods  [] NCM Sodium Free Flavoring Tips    [] Heart Healthy Eating Label Reading Tips   [] Healthy Eating when Eating Out  [] Controlling Your Fluids   [] [facility] (from [employer])  [] NCM Carbohydrate Counting for People with Diabetes   [] Managing Your Diabetes Plate Method     -Diet Recommendations: 2000 mg Sodium/day, 64 oz Fluids/day         Monitoring/Evaluation:   -Barriers: none  -Evaluation of education: Indicates understanding.  -Expected compliance: good.       Total time involved in patient education: 15 minutes        Electronically signed by Cezar Rush RD, LD on 11/30/2022 at 1:16 PM

## 2022-11-30 NOTE — DISCHARGE INSTRUCTIONS
Lab work 1 day prior to follow up appointment- already ordered. Please follow up with Dr. Scherrie Dubin doctor) in his office regarding your kidney function. He will want to see your lab work for your appointment. Check your weight on a daily basis and keep a record of this. Stick to a low sodium diet.

## 2022-11-30 NOTE — CARE COORDINATION
INITIAL CASE MANAGEMENT ASSESSMENT    Reviewed chart, met with patient to assess possible discharge needs. Explained Case Management role/services. Living Situation: Patient lives with his partner in a house with 3 steps to enter. ADLs: Independent     DME: None    PT/OT Recs: None     Active Services: None     Transportation: Active /Partner drives     Medications: [employer] on [address]/No PCP or Insurance    PCP: No PCP      HD/PD: N/A    PLAN/COMMENTS: Patient I currently not working. He has no PCP. He has not been on any of his medicines. His partner can afford $50 for prescriptions. He will need a referral to [facility] and possible assistance with discharge medications. SW/CM provided contact information for patient or family to call with any questions. SW/CM will follow and assist as needed.    Electronically signed by Ananya Mcgee RN on 11/30/2022 at 4:45 PM

## 2022-11-30 NOTE — CONSULTS
[facility]  HEART FAILURE PROGRAM      NAME:  Jaclyn Umana RECORD NUMBER:  [id number]  AGE: 60 y.o. GENDER: male  : 1960  TODAY'S DATE:  2022    Subjective:     VISIT TYPE: evaluation     ADMITTING PHYSICIAN:  Александр Benavides MD    PAST MEDICAL HISTORY        Diagnosis Date    Back pain     Depression     Erectile dysfunction     Hypertension     IFG (impaired fasting glucose)     Kidney stone     Obesity        SOCIAL HISTORY    Social History     Tobacco Use    Smoking status: Former     Packs/day: 1.00     Years: 15.00     Pack years: 15.00     Types: Cigarettes     Quit date: 1993     Years since quittin.0    Smokeless tobacco: Never   Vaping Use    Vaping Use: Never used   Substance Use Topics    Alcohol use: Yes     Comment: very rare    Drug use: Yes     Types:  Other-see comments, Cocaine     Comment: 22  snorts cocaine regularly      heroin/ snorted       ALLERGIES    No Known Allergies    MEDICATIONS  Scheduled Meds:   predniSONE  40 mg Oral Daily    furosemide  40 mg IntraVENous BID    valsartan  40 mg Oral Daily    sodium chloride flush  5-40 mL IntraVENous 2 times per day    enoxaparin  30 mg SubCUTAneous BID       ADMIT DATE: 2022      Objective:     ADMISSION DIAGNOSIS:   Cardiomyopathy ([address] [I42.9]  QT prolongation [R94.31]  Cocaine use [F14.90]  Acute congestive heart failure, unspecified heart failure type (HCC) [I50.9]     /77   Pulse (!) 113   Temp 98.3 °F (36.8 °C) (Oral)   Resp 20   Ht 5' 10\" (1.778 m)   Wt 246 lb 0.5 oz (111.6 kg)   SpO2 95%   BMI 35.30 kg/m²     ADMIT:  Weight: 262 lb 12.6 oz (119.2 kg)    TODAY: Weight: 246 lb 0.5 oz (111.6 kg)    Wt Readings from Last 10 Encounters:   22 246 lb 0.5 oz (111.6 kg)   22 264 lb 15.9 oz (120.2 kg)   21 224 lb 3.3 oz (101.7 kg)   21 235 lb 6.4 oz (106.8 kg)   20 235 lb 9.6 oz (106.9 kg)   19 225 lb (102.1 kg)   19 225 lb (102.1 kg)   06/20/19 222 lb (100.7 kg)   11/09/18 223 lb (101.2 kg)   04/13/18 221 lb (100.2 kg)          Intake/Output Summary (Last 24 hours) at 11/30/2022 1036  Last data filed at 11/30/2022 0326  Gross per 24 hour   Intake 240 ml   Output 1800 ml   Net -1560 ml       LABS  BMP:   Lab Results   Component Value Date/Time     11/30/2022 05:23 AM    K 3.8 11/30/2022 05:23 AM    K 4.7 11/27/2022 06:57 PM    CL 97 11/30/2022 05:23 AM    CO2 31 11/30/2022 05:23 AM    BUN 19 11/30/2022 05:23 AM    LABALBU 3.2 11/29/2022 06:04 AM    CREATININE 1.5 11/30/2022 05:23 AM    CALCIUM 8.5 11/30/2022 05:23 AM    GFRAA >60 07/16/2021 02:29 AM    LABGLOM 52 11/30/2022 05:23 AM    GLUCOSE 136 11/30/2022 05:23 AM     CBC:   Recent Labs     11/27/22  1857 11/28/22  0952 11/29/22  0604   WBC 7.1 7.0 8.2   HGB 13.3* 13.7 13.9   HCT 39.3* 41.7 40.9   MCV 86.0 89.5 88.1    285 271     BNP: No results found for: BNP    ECHOCARDIOGRAM:    Summary   Left ventricular cavity size is moderately dilated with normal thickness   Left ventricular ejection fraction is visually estimated to be 10-15%   Grade III diastolic dysfunction with elevated LV filling pressures. Normal right ventricular size. Right ventricular systolic function is   moderately reduced. IVC size is dilated (>2.1 cm) and collapses < 50% with respiration   consistent with elevated RA pressure (15 mmHg). A bubble study was performed and fails to show evidence of shunting. The left atrium is moderately dilated. The right atrium is severely dilated.       Signature      ------------------------------------------------------------------   Electronically signed by Rico BellaInterpreting   physician) on 11/28/2022 at 05:45 PM   ------------------------------------------------------------------       Assessment:     CONSULTS:   IP CONSULT TO HOSPITALIST  IP CONSULT TO HEART FAILURE NURSE/COORDINATOR  IP CONSULT TO DIETITIAN  IP CONSULT TO CARDIOLOGY    Patient has a CARDIOLOGY CONSULT: Yes      Patient taking an ACEI/ARB:  Yes       Patient taking a BETA BLOCKER:  No:will address closer to d/c    SCALE AVAILABLE:  Yes     EDUCATION STATUS: Patient   [x]  Provided both written and verbal education on Heart Failure signs/symptoms. [x]  Provided instructions on daily medications. [x]  Provided instructions to monitor and record weight daily. [x]  Provided instructions to call if weight increases 3 lbs in one day or 5 lbs in one week. [x]  Received verbal acknowledgment/understanding of Heart Failure related causes. [x]  Provided instructions on how to maintain a low sodium diet. []  Provided recommendations for smoking cessation programs  [x]  Provided recommendations on activity and exercise    []  Other:   Mr Juliette Ford is a pleasant, and normally healthy( because he does not have routine medical care)  by trade, came in with c/o sob, cough , sore throat and what he thought was bronchitis or the flu. However, his chest x-ray showed cardiomegaly without acute infiltrates,and he had elevated BNP,--his echo came back with  an EF 10-15%,  grade 3 diastolic dysfunction. After a lengthy stay in the ED, he arrived to his room. I introduced myself and my role in heart failure education. He was happy to talk with me. I acknowledged that he has been given a lot of unexpected information--especially since he thought he was simply coming in for maybe a case of the flu--he agreed and asked me if he could get better. I explained that is certainly our hope/plan. We all want him to have CM with a recovered EF! We will all work together--we will provide him with the information , and tools he needs, then he will need to do his work at managing this chronic condition at home. He agreed. I asked him about his cocaine use. He was forthcoming about his positive tox screen for cocaine.   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 He said he had smoked some cocaine on the weekends recently. He was going through some emotional things, and said he knows this was \"stupid\" and he wont be doing that anymore. At all. I did explain how cocaine can lead to sudden cardiac death--he is already at risk for such things with a heart as weak as his, he wouldn't want to add any more risk factors. He agreed. We talked about the importance of the daily weight--as the first sign of volume overload. As we talked, he was realizing that he had been slowly feeling worse, and worse--not just with these flu-like symptoms. I reviewed the Yellow zone s/s at length--pointing out that the early s/s are sublte and easy to miss, or to explain away, this included the 3/5 lb rule. Because his pumping function is so weak, he will need help should he find himself in the yellow zone. He is to call the I office, and identify himself as a heart failure patient--the sooner he can get back to the \"green\". He will never be able to \"rest \" this away by an afternoon on the couch. We talked about the 2gm Na diet, and the 64 ounce fluid restriction. He admits this will be a struggle, but he is willing to do what it takes to get back to normal.  His usual weight is around 240lbs. He came in at 260 lbs. He understands that he will be having a L/RHC in the next few days, to really get a handle on what is going on. He denied questions about this, but I assured him it is not nearly as hard as he may think it is. He has all the phone numbers he needs to reach out should questions arise. He is aware that he will have a follow up appointment in place at the time of his discharge. CURRENT DIET: ADULT DIET; Regular; Low Sodium (2 gm); 1800 ml; No Caffeine    EDUCATIONAL PACKETS PROVIDED- [facility] Heart Failure Booklet  Titles and material given:  Yes   [x]  What is Heart Failure?   [x]  Heart Failure: Warning Signs of a Flare-Up  [x]  Heart Failure: Making Changes to Your Diet  [x]  Heart Failure: Medications to Help Your Heart   [x]  Other: Food guide. Fast food Nutrition Guide, HF Grant yamilet from [address] [place] [address]. PATIENT/CAREGIVER TEACHING:    Level of patient/caregiver understanding able to:   [x] Verbalize understanding   [x] Demonstrate understanding       [x] Teach back        [] Needs reinforcement     []  Other:      TEACHING TIME:  40 minutes       Plan:       DISCHARGE PLAN:  Placement for patient upon discharge: home with support   Hospice Care:  no  Code Status: Full Code  Discharge appointment scheduled: Yes     RECOMMENDATIONS:   [x]  Encourage to call Heart Failure Resource Line with any questions or concerns. [x]  Educate further Mr. Santillan's on fluid restriction 48 oz- 64 oz during inpatient stay so he can understand how to measure intake at home. [x]  Continue to educate on S/S of Heart Failure. [x]  Emphasize daily weights, diet, and if changes, to call Heart Failure Resource Line  [x]  Other:  Referrals sent for [facility], Cardiac Rehab.           Electronically signed by Sahara Barnett RN, BSN  on 11/30/2022 at 10:36 AM

## 2022-12-01 LAB — S PYO THROAT QL CULT: NORMAL

## 2022-12-05 ENCOUNTER — FOLLOWUP TELEPHONE ENCOUNTER (OUTPATIENT)
Dept: INPATIENT UNIT | Age: 62
End: 2022-12-05

## 2022-12-05 NOTE — PROGRESS NOTES
LATE ENTRY called pt on 12/3/22 for 72 hour follow up call. Pt still sounds hoarse, and has a cough,but he is doing well with following a low sodium diet. He is 241 lbs which is his dry weight. He filled all of of prescriptions. He is aware of his follow up appointment on 12/7 with Dr. Swetha Irizarry and plans of attending . He c/o fatigue, but I reminded him that his heart is weak and this is to be expected.  He thanked me for calling

## 2022-12-06 NOTE — PROGRESS NOTES
Substance Use Topics    Alcohol use: Yes     Comment: very rare    Drug use: Yes     Types: Other-see comments, Cocaine     Comment: 11/27/22  snorts cocaine regularly    2021  heroin/ snorted       No Known Allergies  Current Outpatient Medications   Medication Sig Dispense Refill    valsartan (DIOVAN) 40 MG tablet Take 1 tablet by mouth daily 30 tablet 3    furosemide (LASIX) 40 MG tablet Take 1 tablet by mouth daily 30 tablet 3    spironolactone (ALDACTONE) 25 MG tablet Take 1 tablet by mouth daily 30 tablet 3     No current facility-administered medications for this visit. Review of Systems:  Constitutional: No unanticipated weight loss. There's been no change in energy level, sleep pattern, or activity level. No fevers, chills. Eyes: No visual changes or diplopia. No scleral icterus. ENT: No Headaches, hearing loss or vertigo. No mouth sores or sore throat. Cardiovascular: as reviewed in HPI  Respiratory: No cough or wheezing, no sputum production. No hemoptysis. Gastrointestinal: No abdominal pain, appetite loss, blood in stools. No change in bowel or bladder habits. Genitourinary: No dysuria, trouble voiding, or hematuria. Musculoskeletal:  No gait disturbance, no joint complaints. Integumentary: No rash or pruritis. Neurological: No headache, diplopia, change in muscle strength, numbness or tingling. Psychiatric: No anxiety or depression. Endocrine: No temperature intolerance. No excessive thirst, fluid intake, or urination. No tremor. Hematologic/Lymphatic: No abnormal bruising or bleeding, blood clots or swollen lymph nodes. Allergic/Immunologic: No nasal congestion or hives.     Physical Exam:   /74 (Site: Right Upper Arm, Position: Sitting)   Pulse (!) 108   Ht 5' 10\" (1.778 m)   Wt 250 lb (113.4 kg)   SpO2 97%   BMI 35.87 kg/m²   Wt Readings from Last 3 Encounters:   12/07/22 250 lb (113.4 kg)   11/30/22 246 lb 0.5 oz (111.6 kg)   11/27/22 264 lb 15.9 oz (120.2 kg) Constitutional: He is oriented to person, place, and time. He appears well-developed and well-nourished. In no acute distress. Head: Normocephalic and atraumatic. Pupils equal and round. Neck: Neck supple. No JVP or carotid bruit appreciated. No mass and no thyromegaly present. No lymphadenopathy present. Cardiovascular: Normal rate. Normal heart sounds. Exam reveals no gallop and no friction rub. No murmur heard. Pulmonary/Chest: Effort normal and breath sounds normal. No respiratory distress. He has no wheezes, rhonchi or rales. Abdominal: Soft, non-tender. Bowel sounds are normal. He exhibits no organomegaly, mass or bruit. Extremities: No edema. No cyanosis or clubbing. Pulses are 2+ radial/carotid bilaterally. Neurological: No gross cranial nerve deficit. Coordination normal.   Skin: Skin is warm and dry. There is no rash or diaphoresis. Psychiatric: He has a normal mood and affect. His speech is normal and behavior is normal.     Lab Review:   FLP:    Lab Results   Component Value Date/Time    TRIG 81 11/29/2022 06:04 AM    HDL 44 11/29/2022 06:04 AM    LDLCALC 61 11/29/2022 06:04 AM    LABVLDL 16 11/29/2022 06:04 AM     BUN/Creatinine:    Lab Results   Component Value Date/Time    BUN 19 11/30/2022 05:23 AM    CREATININE 1.5 11/30/2022 05:23 AM       EKG Interpretation: SINUS TACHYCARDIA, RATE 104    Image Review:   Echo: 11/28/22  Left ventricular cavity size is moderately dilated with normal thickness   Left ventricular ejection fraction is visually estimated to be 10-15%   Grade III diastolic dysfunction with elevated LV filling pressures. Normal right ventricular size. Right ventricular systolic function is   moderately reduced. IVC size is dilated (>2.1 cm) and collapses < 50% with respiration   consistent with elevated RA pressure (15 mmHg). A bubble study was performed and fails to show evidence of shunting. The left atrium is moderately dilated.    The right atrium is severely dilated. Assessment/Plan:     Combined systolic and diastolic heart failure. NYHA Class II. LVEF 10-15%. GDMT to include Lasix, valsartan and spironolactone. BB previously on hold due to hx of polysubstance abuse. Patient admits to cessation of drug use and therefore, I will begin Carvedilol 6.25 mg BID. Long discussion of the adverse effects if he uses drugs while taking BB and the need to immediately stop BB therapy. Will continue to up-titrate medication as tolerated. Discussed fluid/sodium restriction. Will arrange for repeat echocardiogram once on GDMT for 3 months. Will need ischemic work-up with [facility]. The risks, benefits and alternatives of the procedure were discussed with the patient. The risks include but are not limited to: vascular injury, bleeding, infection, injury to surrounding structures, stroke, myocardial infarction and death. The patient is amenable to undergoing the procedure. We will have this scheduled. Essential hypertension. Goal <130/80. On valsartan. BP controlled on today's visit. Polysubstance abuse. Patient admits cessation. Snoring. Will arrange for sleep study. Return to the office in 2 months       Thank you very much for allowing me to participate in the care of your patient. Please do not hesitate to contact me if you have any questions. Sincerely,    Selena Cole MD  Interventional Cardiology    [facility], [address] [place] [address]  Ph: [phone]  Fax: [phone]    This note was scribed in the presence of Dr. Erik Skelton MD by Elie Snyder, ALAN    Physician Attestation:  The scribes documentation has been prepared under my direction and personally reviewed by me in its entirety. I confirm the note above accurately reflects all work, treatment, procedures, and medical decision making performed by me.     Electronically signed by Selena Cole MD on 12/7/2022 at 1:26 PM

## 2022-12-07 ENCOUNTER — OFFICE VISIT (OUTPATIENT)
Dept: CARDIOLOGY CLINIC | Age: 62
End: 2022-12-07

## 2022-12-07 ENCOUNTER — TELEPHONE (OUTPATIENT)
Dept: PHARMACY | Age: 62
End: 2022-12-07

## 2022-12-07 VITALS
HEART RATE: 108 BPM | HEIGHT: 70 IN | OXYGEN SATURATION: 97 % | WEIGHT: 250 LBS | DIASTOLIC BLOOD PRESSURE: 74 MMHG | SYSTOLIC BLOOD PRESSURE: 128 MMHG | BODY MASS INDEX: 35.79 KG/M2

## 2022-12-07 DIAGNOSIS — I50.42 CHRONIC COMBINED SYSTOLIC AND DIASTOLIC CONGESTIVE HEART FAILURE (HCC): Primary | ICD-10-CM

## 2022-12-07 DIAGNOSIS — R06.83 SNORING: ICD-10-CM

## 2022-12-07 DIAGNOSIS — I10 ESSENTIAL HYPERTENSION: ICD-10-CM

## 2022-12-07 DIAGNOSIS — F19.10 POLYSUBSTANCE ABUSE (HCC): ICD-10-CM

## 2022-12-07 PROCEDURE — 99204 OFFICE O/P NEW MOD 45 MIN: CPT | Performed by: STUDENT IN AN ORGANIZED HEALTH CARE EDUCATION/TRAINING PROGRAM

## 2022-12-07 PROCEDURE — 93000 ELECTROCARDIOGRAM COMPLETE: CPT | Performed by: STUDENT IN AN ORGANIZED HEALTH CARE EDUCATION/TRAINING PROGRAM

## 2022-12-07 PROCEDURE — 3078F DIAST BP <80 MM HG: CPT | Performed by: STUDENT IN AN ORGANIZED HEALTH CARE EDUCATION/TRAINING PROGRAM

## 2022-12-07 PROCEDURE — 3074F SYST BP LT 130 MM HG: CPT | Performed by: STUDENT IN AN ORGANIZED HEALTH CARE EDUCATION/TRAINING PROGRAM

## 2022-12-07 RX ORDER — CARVEDILOL 6.25 MG/1
6.25 TABLET ORAL 2 TIMES DAILY
Qty: 60 TABLET | Refills: 3 | Status: SHIPPED | OUTPATIENT
Start: 2022-12-07

## 2022-12-07 NOTE — TELEPHONE ENCOUNTER
New referral for Heart Failure Services. Reached out to schedule the initial appointment. Spoke with patient. Scheduled for 12/19/22    Xin Chiang, PharmD  [facility]  Heart Failure Service  [phone]    For Pharmacy [address] [place] [address] in place:  Yes  Recommendation Provided To:    Intervention Detail:   Gap Closed?:    Intervention Accepted By:   Time Spent (min): 10

## 2022-12-08 ENCOUNTER — TELEPHONE (OUTPATIENT)
Dept: CARDIOLOGY CLINIC | Age: 62
End: 2022-12-08

## 2022-12-08 NOTE — TELEPHONE ENCOUNTER
[facility] for patient to return call to get scheduled for procedure. Message was left on mobile as home number won't take messages. The morning of your procedure you will park in the hospital parking lot  and report directly to the cath lab to check in.    Pre-Procedure Instructions: You will need to fast for at least 8 hours prior to procedure. No  caffeine, gum or mints the morning of procedure. 2.    Hold your diuretic, LASIX AND SPIRONOLACTONE the morning  of procedure. 3.    All other medications can be taken in the morning with sips of  water. 4.    You will need to take 325 mg aspirin the morning of. If you are  currently taking 81 mg please take 4 tablets that morning. 5.     Do not use any lotions, creams or perfume the morning of  procedure. 6.     Pre-procedure lab work will need to be completed 5-7 days prior  to procedure. 7.      Please have a responsible adult to drive you home after  procedure. We advise you have someone stay with you for the  first 24 hours for precautionary measures. Depending on your  procedure you may require an overnight stay. 8.     Cath lab will provide you with all post procedure instructions. If you have any questions regarding the procedure itself or medications,  please call [phone] and ask to speak with a nurse.

## 2022-12-09 NOTE — TELEPHONE ENCOUNTER
Spoke with the patient and got him scheduled for his procedure. We went over his pre-procedure instructions below. He verbalized understanding. Procedure [facility]   Date: 12/19/2022  Arrival time: 6:30 am   Procedure time: 8:00 am     The morning of your procedure you will park in the hospital parking lot  and report directly to the cath lab to check in.     Pre-Procedure Instructions: You will need to fast for at least 8 hours prior to procedure. No  caffeine, gum or mints the morning of procedure. 2.    Hold your diuretic, LASIX AND SPIRONOLACTONE the morning  of procedure. 3.    All other medications can be taken in the morning with sips of  water. 4.    You will need to take 325 mg aspirin the morning of. If you are  currently taking 81 mg please take 4 tablets that morning. 5.     Do not use any lotions, creams or perfume the morning of  procedure. 6.     Pre-procedure lab work will need to be completed 5-7 days prior  to procedure. 7.      Please have a responsible adult to drive you home after  procedure. We advise you have someone stay with you for the  first 24 hours for precautionary measures. Depending on your  procedure you may require an overnight stay. 8.     Cath lab will provide you with all post procedure instructions. If you have any questions regarding the procedure itself or medications,  please call [phone] and ask to speak with a nurse.     Qgenda/teams updated and emailed cath lab

## 2022-12-15 ENCOUNTER — HOSPITAL ENCOUNTER (OUTPATIENT)
Age: 62
Discharge: HOME | End: 2022-12-15
Payer: MEDICAID

## 2022-12-15 DIAGNOSIS — I10 ESSENTIAL HYPERTENSION: ICD-10-CM

## 2022-12-15 DIAGNOSIS — I50.42 CHRONIC COMBINED SYSTOLIC AND DIASTOLIC CONGESTIVE HEART FAILURE (HCC): ICD-10-CM

## 2022-12-15 LAB
ANION GAP SERPL CALCULATED.3IONS-SCNC: 11 MMOL/L (ref 3–16)
BASOPHILS ABSOLUTE: 0 K/UL (ref 0–0.2)
BASOPHILS RELATIVE PERCENT: 0.6 %
BUN BLDV-MCNC: 21 MG/DL (ref 7–20)
CALCIUM SERPL-MCNC: 9.5 MG/DL (ref 8.3–10.6)
CHLORIDE BLD-SCNC: 101 MMOL/L (ref 99–110)
CO2: 30 MMOL/L (ref 21–32)
CREAT SERPL-MCNC: 1.3 MG/DL (ref 0.8–1.3)
EOSINOPHILS ABSOLUTE: 0 K/UL (ref 0–0.6)
EOSINOPHILS RELATIVE PERCENT: 0.3 %
GFR SERPL CREATININE-BSD FRML MDRD: >60 ML/MIN/{1.73_M2}
GLUCOSE BLD-MCNC: 78 MG/DL (ref 70–99)
HCT VFR BLD CALC: 47 % (ref 40.5–52.5)
HEMOGLOBIN: 15.5 G/DL (ref 13.5–17.5)
LYMPHOCYTES ABSOLUTE: 1.1 K/UL (ref 1–5.1)
LYMPHOCYTES RELATIVE PERCENT: 12.5 %
MCH RBC QN AUTO: 28.5 PG (ref 26–34)
MCHC RBC AUTO-ENTMCNC: 33 G/DL (ref 31–36)
MCV RBC AUTO: 86.4 FL (ref 80–100)
MONOCYTES ABSOLUTE: 0.7 K/UL (ref 0–1.3)
MONOCYTES RELATIVE PERCENT: 7.3 %
NEUTROPHILS ABSOLUTE: 7.1 K/UL (ref 1.7–7.7)
NEUTROPHILS RELATIVE PERCENT: 79.3 %
PDW BLD-RTO: 13.7 % (ref 12.4–15.4)
PLATELET # BLD: 370 K/UL (ref 135–450)
PMV BLD AUTO: 7.9 FL (ref 5–10.5)
POTASSIUM SERPL-SCNC: 4.2 MMOL/L (ref 3.5–5.1)
RBC # BLD: 5.44 M/UL (ref 4.2–5.9)
SODIUM BLD-SCNC: 142 MMOL/L (ref 136–145)
WBC # BLD: 8.9 K/UL (ref 4–11)

## 2022-12-15 PROCEDURE — 80048 BASIC METABOLIC PNL TOTAL CA: CPT

## 2022-12-15 PROCEDURE — 36415 COLL VENOUS BLD VENIPUNCTURE: CPT

## 2022-12-15 PROCEDURE — 85025 COMPLETE CBC W/AUTO DIFF WBC: CPT

## 2022-12-19 ENCOUNTER — HOSPITAL ENCOUNTER (OUTPATIENT)
Dept: CARDIAC CATH/INVASIVE PROCEDURES | Age: 62
Discharge: HOME | End: 2022-12-19
Payer: MEDICAID

## 2022-12-19 VITALS
HEART RATE: 109 BPM | SYSTOLIC BLOOD PRESSURE: 137 MMHG | HEIGHT: 70 IN | OXYGEN SATURATION: 98 % | RESPIRATION RATE: 16 BRPM | DIASTOLIC BLOOD PRESSURE: 97 MMHG | BODY MASS INDEX: 35.79 KG/M2 | WEIGHT: 250 LBS | TEMPERATURE: 97 F

## 2022-12-19 DIAGNOSIS — I42.9 CARDIOMYOPATHY, UNSPECIFIED TYPE (HCC): Primary | ICD-10-CM

## 2022-12-19 PROBLEM — I21.4 NSTEMI (NON-ST ELEVATED MYOCARDIAL INFARCTION) (HCC): Status: ACTIVE | Noted: 2022-12-19

## 2022-12-19 LAB
EKG ATRIAL RATE: 97 BPM
EKG DIAGNOSIS: NORMAL
EKG P AXIS: 61 DEGREES
EKG P-R INTERVAL: 148 MS
EKG Q-T INTERVAL: 386 MS
EKG QRS DURATION: 100 MS
EKG QTC CALCULATION (BAZETT): 490 MS
EKG R AXIS: -4 DEGREES
EKG T AXIS: 52 DEGREES
EKG VENTRICULAR RATE: 97 BPM
POC ACT LR: 332 SEC

## 2022-12-19 PROCEDURE — 2500000003 HC RX 250 WO HCPCS

## 2022-12-19 PROCEDURE — C1753 CATH, INTRAVAS ULTRASOUND: HCPCS

## 2022-12-19 PROCEDURE — C1769 GUIDE WIRE: HCPCS

## 2022-12-19 PROCEDURE — 93005 ELECTROCARDIOGRAM TRACING: CPT | Performed by: STUDENT IN AN ORGANIZED HEALTH CARE EDUCATION/TRAINING PROGRAM

## 2022-12-19 PROCEDURE — C1874 STENT, COATED/COV W/DEL SYS: HCPCS

## 2022-12-19 PROCEDURE — 2709999900 HC NON-CHARGEABLE SUPPLY

## 2022-12-19 PROCEDURE — 99153 MOD SED SAME PHYS/QHP EA: CPT

## 2022-12-19 PROCEDURE — 99152 MOD SED SAME PHYS/QHP 5/>YRS: CPT

## 2022-12-19 PROCEDURE — 6370000000 HC RX 637 (ALT 250 FOR IP)

## 2022-12-19 PROCEDURE — C1887 CATHETER, GUIDING: HCPCS

## 2022-12-19 PROCEDURE — 6360000004 HC RX CONTRAST MEDICATION: Performed by: STUDENT IN AN ORGANIZED HEALTH CARE EDUCATION/TRAINING PROGRAM

## 2022-12-19 PROCEDURE — 6360000002 HC RX W HCPCS

## 2022-12-19 PROCEDURE — 93458 L HRT ARTERY/VENTRICLE ANGIO: CPT

## 2022-12-19 PROCEDURE — 85347 COAGULATION TIME ACTIVATED: CPT

## 2022-12-19 PROCEDURE — 92978 ENDOLUMINL IVUS OCT C 1ST: CPT

## 2022-12-19 PROCEDURE — 92928 PRQ TCAT PLMT NTRAC ST 1 LES: CPT

## 2022-12-19 PROCEDURE — C1725 CATH, TRANSLUMIN NON-LASER: HCPCS

## 2022-12-19 PROCEDURE — C1894 INTRO/SHEATH, NON-LASER: HCPCS

## 2022-12-19 RX ORDER — ACETAMINOPHEN 325 MG/1
650 TABLET ORAL EVERY 4 HOURS PRN
Status: DISCONTINUED | OUTPATIENT
Start: 2022-12-19 | End: 2022-12-20 | Stop reason: HOSPADM

## 2022-12-19 RX ORDER — SODIUM CHLORIDE 9 MG/ML
INJECTION, SOLUTION INTRAVENOUS PRN
Status: DISCONTINUED | OUTPATIENT
Start: 2022-12-19 | End: 2022-12-20 | Stop reason: HOSPADM

## 2022-12-19 RX ORDER — SODIUM CHLORIDE 0.9 % (FLUSH) 0.9 %
5-40 SYRINGE (ML) INJECTION EVERY 12 HOURS SCHEDULED
Status: DISCONTINUED | OUTPATIENT
Start: 2022-12-19 | End: 2022-12-20 | Stop reason: HOSPADM

## 2022-12-19 RX ORDER — SODIUM CHLORIDE 0.9 % (FLUSH) 0.9 %
5-40 SYRINGE (ML) INJECTION PRN
Status: DISCONTINUED | OUTPATIENT
Start: 2022-12-19 | End: 2022-12-20 | Stop reason: HOSPADM

## 2022-12-19 RX ORDER — ASPIRIN 325 MG
325 TABLET ORAL ONCE
Status: DISCONTINUED | OUTPATIENT
Start: 2022-12-19 | End: 2022-12-20 | Stop reason: HOSPADM

## 2022-12-19 RX ADMIN — IOPAMIDOL 90 ML: 755 INJECTION, SOLUTION INTRAVENOUS at 10:49

## 2022-12-19 NOTE — DISCHARGE INSTRUCTIONS
CARDIAC ANGIOGRAM (LEFT HEART CATH) - RADIAL ACCESS    Care of your puncture site:  Remove clear bandage 24 hours after the procedure. May shower in 24 hours  Inspect the site daily and gently clean using soap and water. Dry thoroughly and apply a Band-Aid. Normal Observations:  Soreness or tenderness which may last one week. Mild oozing from the incision site. Possible bruising that could last 2 weeks. Activity:  You may resume driving 24 hours following the procedure. You may resume normal activity in 3 days or after the wound heals. Avoid lifting more than 10 pounds for 3 days with affected arm. Do not make important / legal decisions within 24 hours after procedure. Nutrition:  Regular diet   Drink at least 8 to 10 glasses of decaffeinated, non-alcoholic fluid for the next 24 hours to flush the x-ray dye used for your angiogram out of your body. Call your doctor immediately if your condition worsens, for any other concerns, for a follow-up appointment or if you experience any of the following:  Significant bleeding that does not stop after 10 minutes of applying firm pressure on the puncture site. Increased swelling of the wrist.  Unusual pain, numbness, or tingling of the wrist/arm. Any signs of infection such as: redness, yellow drainage at the site, swelling or pain. IF experiencing any recurrent chest pain, arm or jaw pain, shortness of breath,sweating,nausea & vomiting, lighteheadedness or sudden weak.

## 2022-12-19 NOTE — H&P
H&P Update - see note from 22    HPI:  The patient is 60 y.o. male with a past medical history significant for depression, hypertension, hyperlipidemia and polysubstance abuse. He presented to [facility] with complaints of shortness of breath that had been worsening for several weeks. Last time he was seen by a physician was 3-4 years prior. History was very difficult to obtain as he was not really forthcoming with conversation. Personally read echocardiogram which showed severe systolic dysfunction with elevated filling pressures. Treated with IV lasix. Presents for follow up reporting his breathing is getting better. He has noticed intermittent SPICER, LE edema and cough. States he previously \"treated myself badly. \" However, he admits to no longer using drugs and is ready to take care of himself. He reports compliance with current medical therapy and tolerating. I have reviewed the history and physical and examined the patient and find no relevant changes. I have reviewed with the patient and/or family the risks, benefits, and alternatives to the procedure. Pre-sedation Assessment    Patient:  Alicia Umanzor   :   1960    Intended Procedure: LHC/Cor angiogram with poss PCI    Vitals:    22 0708   BP: (!) 137/97   Pulse: (!) 109   Resp: 16   Temp: 97 °F (36.1 °C)   SpO2: 98%       Nursing notes reviewed and agreed. Medications reviewed  Allergies: No Known Allergies      Pre-Procedure Assessment/Plan:  ASA 2 - Patient with mild systemic disease with no functional limitations    Mallampati Airway Assessment:  Mallampati Class II - (soft palate, fauces & uvula are visible)    Level of Sedation Plan: Moderate sedation    Post Procedure plan: Return to same level of care    Michael Carmona MD  Interventional Cardiology  8:00 AM

## 2022-12-19 NOTE — PRE SEDATION
Brief Pre-Op Note/Sedation Assessment      Yordy Trinidad  1960  [id number]  8:35 AM    Planned Procedure: Cardiac Catheterization Procedure  Post Procedure Plan: Return to same level of care  Consent: I have discussed with the patient and/or the patient representative the indication, alternatives, and the possible risks and/or complications of the planned procedure and the anesthesia methods. The patient and/or patient representative appear to understand and agree to proceed. Chief Complaint:   NSTEMI  Dyspnea    Indications for Cath Procedure:  Presentation:  ACS > 24 hrs and Cardiomyopathy  2. Anginal Classification within 2 weeks:  CCS IV - Inability to perform any activity without angina or angina at rest, i.e., severe limitation  3. Angina Symptoms Assessment:  Atypical Chest Pain  4. Heart Failure Class within last 2 weeks:  Yes:  Heart Failure Type: Systolic Severity:  Class III - Symptoms of HF on less-than-ordinary exertion  5. Cardiovascular Instability:  No    Prior Ischemic Workup/Eval:  Pre-Procedural Medications: Yes: ACE/ARB/ARNI, Aspirin, Beta Blockers, and STATIN  2. Stress Test Completed? No    Does Patient need surgery?   Cath Valve Surgery:  No    Pre-Procedure Medical History:  Vital Signs:  BP (!) 137/97   Pulse (!) 109   Temp 97 °F (36.1 °C)   Resp 16   Ht 5' 10\" (1.778 m)   Wt 250 lb (113.4 kg)   SpO2 98%   BMI 35.87 kg/m²     Allergies:  No Known Allergies  Medications:    Current Outpatient Medications   Medication Sig Dispense Refill    carvedilol (COREG) 6.25 MG tablet Take 1 tablet by mouth 2 times daily 60 tablet 3    valsartan (DIOVAN) 40 MG tablet Take 1 tablet by mouth daily 30 tablet 3    furosemide (LASIX) 40 MG tablet Take 1 tablet by mouth daily 30 tablet 3    spironolactone (ALDACTONE) 25 MG tablet Take 1 tablet by mouth daily 30 tablet 3     Current Facility-Administered Medications   Medication Dose Route Frequency Provider Last Rate Last Admin sodium chloride flush 0.9 % injection 5-40 mL  5-40 mL IntraVENous 2 times per day Dave Tucker MD        sodium chloride flush 0.9 % injection 5-40 mL  5-40 mL IntraVENous PRN Dave Tucker MD        0.9 % sodium chloride infusion   IntraVENous PRN Dave Tucker MD        aspirin tablet 325 mg  325 mg Oral Once Dave Tucker MD           Past Medical History:    Past Medical History:   Diagnosis Date    Back pain     Depression     Erectile dysfunction     Hypertension     IFG (impaired fasting glucose)     Kidney stone     Obesity        Surgical History:    Past Surgical History:   Procedure Laterality Date    BACK SURGERY  07/01/2016    microlumber discectomy L5-S1    EXCISION / BIOPSY SKIN LESION OF TRUNK N/A 8/23/2019    EXCISION LIPOMA ON BACK performed by Marcie Cheney MD at [facility]      all 3    KIDNEY STONE SURGERY Left 1996    ATTEMPTED RETRIEVAL X3             Pre-Sedation:  Pre-Sedation Documentation and Exam:  I have personally completed a history, physical exam & review of systems for this patient (see notes). Prior History of Anesthesia Complications:   none    Modified Mallampati:  II (soft palate, uvula, fauces visible)    ASA Classification:  Class 2 - A normal healthy patient with mild systemic disease    Priti Scale: Activity:  2 - Able to move 4 extremities voluntarily on command  Respiration:  2 - Able to breathe deeply and cough freely  Circulation:  2 - BP+/- 20mmHg of normal  Consciousness:  2 - Fully awake  Oxygen Saturation (color):  2 - Able to maintain oxygen saturation >92% on room air    Sedation/Anesthesia Plan:  Guard the patient's safety and welfare. Minimize physical discomfort and pain. Minimize negative psychological responses to treatment by providing sedation and analgesia and maximize the potential amnesia. Patient to meet pre-procedure discharge plan.     Medication Planned:  midazolam intravenously and fentanyl intravenously    Patient is an appropriate candidate for plan of sedation:   yes      Electronically signed by Saeed Vega MD on 12/19/2022 at 8:35 AM

## 2022-12-19 NOTE — PROCEDURES
Cardiac Catheterization Operative Report    Ellis Karimi  [id number]  12/19/2022  Hanna Fountain MD    Patient has a diagnosis of acute coronary syndrome, LV dysfunction. He was referred for cardiac catheterization to Define coronary anatomy and assess intravascular filling pressures. Procedure Details  The risks, benefits, complications, treatment options, and expected outcomes were discussed with the patient. The patient and/or family concurred with the proposed plan, giving informed consent. Patient was brought to the cath lab after IV hydration was begun and oral premedication was given. He was further sedated with fentanyl and versed. He was prepped and draped in the usual manner. Using the modified Seldinger access technique, a 6 Japanese sheath was placed in the Right Radial artery. Heparin was administered. A left heart catheterization was done. Angiograms were also done. Sedation:   Start time 0756  End time 0830  1mg Versed / 25mcg Fentanyl  A trained independent observer was present to assist with monitoring sedated patient    90cc contrast    Interventions:  PCI performed to proximal LAD described below    After the procedure was completed. sedation was stopped and the sheaths and catheters were all removed. Hemostasis was achieved with manual pressure.     Findings:    Hemodynamics  LVEDP 8mmHg   Left Main  No angiographically significant disease, MLA > 10mm2   LAD  Proximal 90% stenosis with ulcerated fibrotic fatty plaque on IVUS, culprit for presentation   Circ  Dominant, minor luminal irregularities   RCA  Minor luminal irregularities       Interventions/Vessels  PCI of the proximal LAD  - IVUS performed 90% stenosis, MLA < 4.0mm2  - PCI performed with Resolute [place] COMPA 3.5x26mm  - Post dilation performed with a 4.0mm NC balloon  - Post angiographic result with JERRELL 3 flow, excellent result   Guides/Wires  XB 3.0   Terumo RunThrough   Devices  [place] IVUS OptiCross   Post % Stenosis  0%

## 2022-12-20 RX ORDER — CARVEDILOL 6.25 MG/1
TABLET ORAL
Qty: 180 TABLET | OUTPATIENT
Start: 2022-12-20

## 2022-12-23 ENCOUNTER — TELEPHONE (OUTPATIENT)
Dept: PHARMACY | Age: 62
End: 2022-12-23

## 2022-12-23 NOTE — TELEPHONE ENCOUNTER
New referral for Heart Failure Services. Reached out to reschedule his missed appointment 12/19/22. He had a heart cath that day and did want to have to f/u with me understandably. He was agreeable to reschedule after the holiday. Asked about our services. When I mentioned medication review, he had questions about his medication. Reports he was taking spironolactone 20mg and then they added 40mg and now is taking 60mg a day. I reported the spironolactone is 25mg daily. Likely the Lasix he is talking about. Advised it should be 40mg daily. I ended our conversation after I realized he was driving and having difficulty in the snowy and icy roads. I will try back at a later time. Carlotta Sharp, PharmD  [facility]  Heart Failure Service  [phone]    For Pharmacy [address] [place] [address] in place:  Yes  Recommendation Provided To:    Intervention Detail:   Gap Closed?:    Intervention Accepted By:   Time Spent (min): 10

## 2022-12-23 NOTE — TELEPHONE ENCOUNTER
Talked to him later. Looks like he was talking about the Lasix. Used to be 20mg twice daily and now is 40mg daily. Advised to just take 40mg daily. Has a cough. Recommended Mucinex and plenty of water. Has more questions about his medications, will call back . Has no PCP. Wants help withthis 23.      Mary Perez, PharmD  [facility]  Heart Failure Service  [phone]    For Pharmacy Admin Tracking Only    Program: Medication Management  CPA in place:  Yes  Recommendation Provided To: Patient/Caregiver: 1 via Telephone  Intervention Detail: Adherence Monitorin and New Rx: 1, reason: Needs Additional Therapy  Intervention Accepted By: Patient/Caregiver: 2  Gap Closed?: Yes   Time Spent (min): 10

## 2023-01-04 ENCOUNTER — OFFICE VISIT (OUTPATIENT)
Dept: PHARMACY | Age: 63
End: 2023-01-04
Payer: MEDICAID

## 2023-01-04 ENCOUNTER — OFFICE VISIT (OUTPATIENT)
Dept: CARDIOLOGY CLINIC | Age: 63
End: 2023-01-04

## 2023-01-04 VITALS
BODY MASS INDEX: 36.51 KG/M2 | HEART RATE: 110 BPM | OXYGEN SATURATION: 98 % | SYSTOLIC BLOOD PRESSURE: 132 MMHG | WEIGHT: 255 LBS | HEIGHT: 70 IN | RESPIRATION RATE: 20 BRPM | DIASTOLIC BLOOD PRESSURE: 88 MMHG

## 2023-01-04 DIAGNOSIS — R06.83 SNORING: ICD-10-CM

## 2023-01-04 DIAGNOSIS — I10 ESSENTIAL HYPERTENSION: ICD-10-CM

## 2023-01-04 DIAGNOSIS — I25.10 CAD IN NATIVE ARTERY: Primary | ICD-10-CM

## 2023-01-04 DIAGNOSIS — F19.10 POLYSUBSTANCE ABUSE (HCC): ICD-10-CM

## 2023-01-04 DIAGNOSIS — I50.22 CHRONIC SYSTOLIC HEART FAILURE (HCC): ICD-10-CM

## 2023-01-04 DIAGNOSIS — I50.42 CHRONIC COMBINED SYSTOLIC AND DIASTOLIC HEART FAILURE (HCC): Primary | ICD-10-CM

## 2023-01-04 DIAGNOSIS — I25.5 ISCHEMIC CARDIOMYOPATHY: ICD-10-CM

## 2023-01-04 PROCEDURE — 93000 ELECTROCARDIOGRAM COMPLETE: CPT | Performed by: STUDENT IN AN ORGANIZED HEALTH CARE EDUCATION/TRAINING PROGRAM

## 2023-01-04 PROCEDURE — 99214 OFFICE O/P EST MOD 30 MIN: CPT

## 2023-01-04 PROCEDURE — 3075F SYST BP GE 130 - 139MM HG: CPT | Performed by: STUDENT IN AN ORGANIZED HEALTH CARE EDUCATION/TRAINING PROGRAM

## 2023-01-04 PROCEDURE — 3079F DIAST BP 80-89 MM HG: CPT | Performed by: STUDENT IN AN ORGANIZED HEALTH CARE EDUCATION/TRAINING PROGRAM

## 2023-01-04 PROCEDURE — 99214 OFFICE O/P EST MOD 30 MIN: CPT | Performed by: STUDENT IN AN ORGANIZED HEALTH CARE EDUCATION/TRAINING PROGRAM

## 2023-01-04 RX ORDER — CYCLOBENZAPRINE HCL 10 MG
10 TABLET ORAL 3 TIMES DAILY PRN
Qty: 21 TABLET | Refills: 0 | Status: SHIPPED | OUTPATIENT
Start: 2023-01-04 | End: 2023-01-14

## 2023-01-04 NOTE — PATIENT INSTRUCTIONS
No medication changes    HEART FAILURE:    Follow up with your Cardiologist, Primary Care Physician and other physicians as instructed. Especially 7 days after a hospitalization. Call right away with any signs or symptoms of heart failure or other medical conditions that need attention or with any questions at all. Call your Cardiologist, Primary Care Physician or [facility] ([phone].) We can help and often prevent an Emergency Room visit or hospitalization. Know your dry weight (your weight without any extra fluid on board)    It is very important to take your medications as prescribed, do not miss any doses. Call for refills before you run out. Typically when you have one week left. If you have trouble getting your medications, call us at [phone]. Avoid NSAIDs (non steroidal anti inflammatory drugs) like Aleve (naproxen), Advil and Motrin (ibuprofen), Mobic (meloxicam), Voltaren (diclofenac), Celebrex (celecoxib) and aspirin. A daily aspirin is okay if recommended by your physician. It is okay to take Tylenol (acetaminophen) unless your physician has told you otherwise. Limit fluid intake. Typically no more than 1,500-2,000 milliliters (mL) per day. Equal to 1 ½ to 2 liters. Equal to approximately 48-64 ounces (oz) per day    Limit sodium intake. Typically no more than 2,000 milligrams (mg) per day. Add up the sodium on the nutrition label for what you eat and drink each day. Weigh yourself every day. Weigh before breakfast and after you go to the restroom. Wear the same thing each time. Keep a log and bring it to each visit. Call your heart doctor or the [address] [place] [address] at [phone] if:  You gain more than 3 pounds in one day. You gain more than 5 pounds in one week. Your weight goes above your dry weight.      If you have hypertension (high blood pressure), your blood pressure goal is less than 130/80 unless instructed differently by your physician. Be sure to keep good control of your Diabetes    Be sure to keep good control of your Cholesterol    Eat a Heart healthy diet    Be active. Goal of 30 minutes 5 times a week. If you smoke, work to stop smoking. Best to avoid alcohol. If you need assistance with completing Advanced Directives, please let us know.

## 2023-01-04 NOTE — PROGRESS NOTES
[facility]      Cardiology Follow Up    Rustam Hager  [birth year] January 4, 2023    Referring Physician: May Fitch MD  Reason for Referral: Hospital follow up    CC: \" My breathing is getting better. \"     HPI:  The patient is 60 y.o. male with a past medical history significant for depression, hypertension, hyperlipidemia and polysubstance abuse. He presented to [facility] with complaints of shortness of breath that had been worsening for several weeks. Last time he was seen by a physician was 3-4 years prior. History was very difficult to obtain as he was not really forthcoming with conversation. Personally read echocardiogram which showed severe systolic dysfunction with elevated filling pressures. Treated with IV lasix. Presents for follow up reporting his breathing is getting better. He has noticed intermittent SPIECR, LE edema and cough. States he previously \"treated myself badly. \" However, he admits to no longer using drugs and is ready to take care of himself. He reports compliance with current medical therapy and tolerating. Patient presents for follow up. States he has been well with the exception of right flank pain. He is weighing himself daily. States he has decided to \"stop using. \" He reports compliance with medications and tolerating. He is excited to resume exercising. States breathing is comfortable. Patient denies any chest pain, pressure, tightness, nausea, vomiting, diaphoresis, SOB at rest / SPICER, palpitations, heart racing, dizziness/lightheadedness, cough, orthopnea, PND, LE edema or syncope.      Past Medical History:   Diagnosis Date    Back pain     Depression     Erectile dysfunction     Hypertension     IFG (impaired fasting glucose)     Kidney stone     Obesity      Past Surgical History:   Procedure Laterality Date    BACK SURGERY  07/01/2016    microlumber discectomy L5-S1    EXCISION / BIOPSY SKIN LESION OF TRUNK N/A 8/23/2019    EXCISION LIPOMA ON BACK performed by Ozzy Holloway MD at [facility]      all 3    KIDNEY STONE SURGERY Left     ATTEMPTED RETRIEVAL X3     Family History   Problem Relation Age of Onset    Mental Illness Other      Social History     Tobacco Use    Smoking status: Former     Packs/day: 1.00     Years: 15.00     Pack years: 15.00     Types: Cigarettes     Quit date: 1993     Years since quittin.1    Smokeless tobacco: Never   Vaping Use    Vaping Use: Never used   Substance Use Topics    Alcohol use: Yes     Comment: very rare    Drug use: Yes     Types: Other-see comments, Cocaine     Comment: 21  snorts cocaine regularly      heroin/ snorted       No Known Allergies  Current Outpatient Medications   Medication Sig Dispense Refill    ticagrelor (BRILINTA) 90 MG TABS tablet Take 1 tablet by mouth 2 times daily 60 tablet 11    carvedilol (COREG) 6.25 MG tablet Take 1 tablet by mouth 2 times daily 60 tablet 3    valsartan (DIOVAN) 40 MG tablet Take 1 tablet by mouth daily 30 tablet 3    furosemide (LASIX) 40 MG tablet Take 1 tablet by mouth daily 30 tablet 3    spironolactone (ALDACTONE) 25 MG tablet Take 1 tablet by mouth daily 30 tablet 3     No current facility-administered medications for this visit. Review of Systems:  Constitutional: No unanticipated weight loss. There's been no change in energy level, sleep pattern, or activity level. No fevers, chills. Eyes: No visual changes or diplopia. No scleral icterus. ENT: No Headaches, hearing loss or vertigo. No mouth sores or sore throat. Cardiovascular: as reviewed in HPI  Respiratory: No cough or wheezing, no sputum production. No hemoptysis. Gastrointestinal: No abdominal pain, appetite loss, blood in stools. No change in bowel or bladder habits. Genitourinary: No dysuria, trouble voiding, or hematuria. Musculoskeletal:  No gait disturbance, no joint complaints. Integumentary: No rash or pruritis.   Neurological: No headache, diplopia, change in muscle strength, numbness or tingling. Psychiatric: No anxiety or depression. Endocrine: No temperature intolerance. No excessive thirst, fluid intake, or urination. No tremor. Hematologic/Lymphatic: No abnormal bruising or bleeding, blood clots or swollen lymph nodes. Allergic/Immunologic: No nasal congestion or hives. Physical Exam:   /88   Pulse (!) 110   Resp 20   Ht 5' 10\" (1.778 m)   Wt 255 lb (115.7 kg)   SpO2 98%   BMI 36.59 kg/m²   Wt Readings from Last 3 Encounters:   01/04/23 255 lb (115.7 kg)   12/19/22 250 lb (113.4 kg)   12/07/22 250 lb (113.4 kg)     Constitutional: He is oriented to person, place, and time. He appears well-developed and well-nourished. In no acute distress. Head: Normocephalic and atraumatic. Pupils equal and round. Neck: Neck supple. No JVP or carotid bruit appreciated. No mass and no thyromegaly present. No lymphadenopathy present. Cardiovascular: Normal rate. Normal heart sounds. Exam reveals no gallop and no friction rub. No murmur heard. Pulmonary/Chest: Effort normal and breath sounds normal. No respiratory distress. He has no wheezes, rhonchi or rales. Abdominal: Soft, non-tender. Bowel sounds are normal. He exhibits no organomegaly, mass or bruit. Extremities: No edema. No cyanosis or clubbing. Pulses are 2+ radial/carotid bilaterally. Neurological: No gross cranial nerve deficit. Coordination normal.   Skin: Skin is warm and dry. There is no rash or diaphoresis. Psychiatric: He has a normal mood and affect.  His speech is normal and behavior is normal.     Lab Review:   FLP:    Lab Results   Component Value Date/Time    TRIG 81 11/29/2022 06:04 AM    HDL 44 11/29/2022 06:04 AM    LDLCALC 61 11/29/2022 06:04 AM    LABVLDL 16 11/29/2022 06:04 AM     BUN/Creatinine:    Lab Results   Component Value Date/Time    BUN 21 12/15/2022 01:25 PM    CREATININE 1.3 12/15/2022 01:25 PM       EKG Interpretation: SINUS TACHYCARDIA, RATE 104    Image Review:     Echo: 11/28/22  Left ventricular cavity size is moderately dilated with normal thickness   Left ventricular ejection fraction is visually estimated to be 10-15%   Grade III diastolic dysfunction with elevated LV filling pressures. Normal right ventricular size. Right ventricular systolic function is   moderately reduced. IVC size is dilated (>2.1 cm) and collapses < 50% with respiration   consistent with elevated RA pressure (15 mmHg). A bubble study was performed and fails to show evidence of shunting. The left atrium is moderately dilated. The right atrium is severely dilated. Stress 11/30/22  Summary    Very mild mid to distal anterior wall perfusion defect with some    reversibility on rest images. This could represent an area of ischemia but    may be due to soft tissue attenuation. LV function is severely reduced with global hypokinesis and ejection    fraction of 28 %. Non-diagnostic EKG response due to failure to reach target heart rate. Overall findings represent a high risk study base on severely reduced LV    function.  .           Cardiac Catheterization Operative Report 12/19/22     Findings:    Hemodynamics  LVEDP 8mmHg   Left Main  No angiographically significant disease, MLA > 10mm2   LAD  Proximal 90% stenosis with ulcerated fibrotic fatty plaque on IVUS, culprit for presentation   Circ  Dominant, minor luminal irregularities   RCA  Minor luminal irregularities         Interventions/Vessels  PCI of the proximal LAD  - IVUS performed 90% stenosis, MLA < 4.0mm2  - PCI performed with Resolute [place] COMPA 3.5x26mm  - Post dilation performed with a 4.0mm NC balloon  - Post angiographic result with JERRELL 3 flow, excellent result   Guides/Wires  XB 3.0   Terumo RunThrough   Devices  [place] IVUS OptiCross   Post % Stenosis  0%   Closure Device  Radial hemostasis band   Complications  None      Conclusion:   - Loaded with 180mg of Ticagrelor prior to PCI  - IVUS guided PCI of LAD with DESx1  - 13cc Radial hemostasis band  - Patient okay for D/C this afternoon    Assessment/Plan:     Coronary artery disease. S/p PCI of LAD with COMPA x1. Continue DAPT with Brilinta and aspirin. Combined systolic and diastolic heart failure. NYHA Class II. LVEF 10-15%. GDMT to include Lasix, valsartan, spironolactone and carvedilol. Will repeat an echocardiogram in February to assess LVEF. Repeat a BMP in 2 weeks time. Essential hypertension. Goal <130/80. On valsartan. BP controlled on today's visit. Polysubstance abuse. Patient admits cessation. Snoring. Will arrange for sleep study. Return to the office in 6 months       Thank you very much for allowing me to participate in the care of your patient. Please do not hesitate to contact me if you have any questions. Sincerely,    Kuldeep Covarrubias MD  Interventional Cardiology    [facility], [address] [place] [address] [address]   [place], [place] [address]  Ph: [phone]  Fax: [phone]    This note was scribed in the presence of Dr. Buffy Guthrie MD by Ana Vernon, RN    Physician Attestation:  The scribes documentation has been prepared under my direction and personally reviewed by me in its entirety. I confirm the note above accurately reflects all work, treatment, procedures, and medical decision making performed by me.     Electronically signed by Kuldeep Covrarubias MD on 1/4/2023 at 5:59 PM

## 2023-01-04 NOTE — PROGRESS NOTES
[facility]  Pharmacist  Heart Failure    [address]  Phone: [phone]  Fax: [phone]      NAME: Shanna Burks  MEDICAL RECORD NUMBER:  [id number]  AGE: 60 y.o. GENDER: male  : 1960  EPISODE DATE:  2023      Shanna Burks was referred to the [facility] by Dr. Armando Weiss for heart failure services. Special Instructions per the Referral Include: Patient Education     ECHO EF%: 10-15 Date: 2022      THIS VISIT WAS PERFORMED AS: An in person visit. Protocols were followed with precautions to reduce the spread of COVID-19. Subjective   Shanna Burks is a 60 y.o. male here for the Heart Failure Services. They are here today for a comprehensive medication review including over the counter medications and herbal products, overall wellbeing assessment, transition of care, extensive education and any needed adjustments with updates and recommendations communicated to the referring physician. New York Heart Association Classification based on patient symptoms:  Class II: Slight limitation of physical activity. Comfortable at rest. Ordinary physical activity results in fatigue, palpitation, dyspnea (shortness of breath). ACC/AHA Stage:   Stage C (with current or previous symptoms/signs of HF)    Shortness of Breath:   Reports SOB, but contributes this to having a cough in which he strained his chest muscle. Hard to take a breath. Now has muscle relaxer from physician. Other Heart Failure Findings:   Denies cough at night or when lying down  Denies lower extremity edema  Denies fluid weight gain  Denies unusual fatigue  Denies early saiety or abdominal fullness    General Findings:  Recent cough that contributing to pain in his right chest area - given flexeril today.       Recent hospitalization:   Hospitalized at [facility] from  to 2022  Chief Complaint: SOB w exertion and [facility] Brief Assessment: Acute systolic and diastolic heart failure  Weight at admission: 262 pounds   Weight at discharge: 246 pounds    2022 cardiac catheterization w PCI w COMPA of LAD    Comments:    - history of polysubstance abuse  - off work X 1 year with decreased activity  - will utilize our free HF med program at the "[employer].   - Medicaid pending. Objective     Patient Active Problem List   Diagnosis Code    Hypotestosteronemia E34.9    Essential hypertension I10    Class 1 obesity due to excess calories with serious comorbidity and body mass index (BMI) of 32.0 to 32.9 in adult E66.09, Z68.32    Mild episode of recurrent major depressive disorder (HCC) F33.0    Other male erectile dysfunction N52.8    IFG (impaired fasting glucose) R73.01    Lipoma of torso D17.1    Cardiomyopathy (HCC) I42.9    NSTEMI (non-ST elevated myocardial infarction) ([facility] I21.4     Social History     Tobacco Use    Smoking status: Former     Packs/day: 1.00     Years: 15.00     Pack years: 15.00     Types: Cigarettes     Quit date: 1993     Years since quittin.2    Smokeless tobacco: Never   Vaping Use    Vaping Use: Never used   Substance Use Topics    Alcohol use: Yes     Comment: very rare    Drug use: Yes     Types: Other-see comments, Cocaine     Comment: 21  snorts cocaine regularly      heroin/ snorted       HPI:   1.  Chronic combined systolic and diastolic heart failure (HCC)          BMP:   Lab Results   Component Value Date/Time     12/15/2022 01:25 PM    K 4.2 12/15/2022 01:25 PM    K 4.7 2022 06:57 PM     12/15/2022 01:25 PM    CO2 30 12/15/2022 01:25 PM    BUN 21 12/15/2022 01:25 PM    CREATININE 1.3 12/15/2022 01:25 PM     Lab Results   Component Value Date/Time    K 4.2 12/15/2022 01:25 PM    K 3.8 2022 05:23 AM    K 4.4 2022 06:04 AM    K 4.7 2022 06:57 PM    K 4.7 2021 02:29 AM    K 5.5 2021 12:15 AM Lab Results   Component Value Date/Time    MG 1.90 11/30/2022 05:23 AM    MG 2.00 11/29/2022 06:04 AM     Lab Results   Component Value Date/Time    CREATININE 1.3 12/15/2022 01:25 PM    CREATININE 1.5 11/30/2022 05:23 AM    CREATININE 1.5 11/29/2022 06:04 AM    CREATININE 1.5 11/28/2022 09:52 AM    CREATININE 1.3 11/27/2022 06:57 PM    CREATININE 1.4 07/16/2021 02:29 AM       CBC:    Lab Results   Component Value Date/Time    WBC 8.9 12/15/2022 01:25 PM    HGB 15.5 12/15/2022 01:25 PM    HCT 47.0 12/15/2022 01:25 PM     12/15/2022 01:25 PM       HgA1C:   Lab Results   Component Value Date    LABA1C 5.2 06/10/2020       TSH: (ref range 0.27 - 4.20 uIU/mL)  Lab Results   Component Value Date    TSH 0.84 06/10/2020    TSH 1.46 06/18/2019       Lipids:   Lab Results   Component Value Date/Time    CHOL 121 11/29/2022 06:04 AM    TRIG 81 11/29/2022 06:04 AM    HDL 44 11/29/2022 06:04 AM    LDLCALC 61 11/29/2022 06:04 AM       ASCVD risk: The ASCVD Risk score (Angela HURD, et al., 2019) failed to calculate for the following reasons: The patient has a prior MI or stroke diagnosis    ProBNP:   Lab Results   Component Value Date/Time    PROBNP 6,881 11/28/2022 09:52 AM    PROBNP 5,910 11/27/2022 06:57 PM       There were no vitals taken for this visit.     BP Readings from Last 3 Encounters:   01/04/23 132/88   12/19/22 (!) 137/97   12/07/22 128/74       Wt Readings from Last 6 Encounters:   01/04/23 255 lb (115.7 kg)   12/19/22 250 lb (113.4 kg)   12/07/22 250 lb (113.4 kg)   11/30/22 246 lb 0.5 oz (111.6 kg)   11/27/22 264 lb 15.9 oz (120.2 kg)   07/16/21 224 lb 3.3 oz (101.7 kg)         Immunizations:   Most Recent Immunizations   Administered Date(s) Administered    Influenza Vaccine, unspecified formulation 08/23/2017    Tdap (Boostrix, Adacel) 06/27/2012       Advanced Directives:  Advance Directives       Power of [address] Will ACP-Advance Directive ACP-Power of     Not on File Not on File Not on File Not on File             Thorough Medication Assessment      Current medications:  Current Outpatient Medications   Medication Sig    cyclobenzaprine (FLEXERIL) 10 MG tablet Take 1 tablet by mouth 3 times daily as needed for Muscle spasms    ticagrelor (BRILINTA) 90 MG TABS tablet Take 1 tablet by mouth 2 times daily    carvedilol (COREG) 6.25 MG tablet Take 1 tablet by mouth 2 times daily    valsartan (DIOVAN) 40 MG tablet Take 1 tablet by mouth daily    furosemide (LASIX) 40 MG tablet Take 1 tablet by mouth daily    spironolactone (ALDACTONE) 25 MG tablet Take 1 tablet by mouth daily     No current facility-administered medications for this visit. Get With The Guidelines:  Diuretic Yes    HFrEF (</=40%) HFrEF / HFimpEF:    - ARNI / ACEI / ARB: Yes   - Beta Blocker (Metoprolol succinate, carvedilol, bisoprolol): Yes  - Mineralocorticoid Receptor Antagonists: Yes  - SGLT2I (Empagliflozin, dapagliflozin): No, Cardiology will address in the outpatient setting    Other Heart Failure Medications:  none    Reviewed all medications at today's visit. Heart Failure Medication Discrepancies: 0  Total Discrepancies: 1      Medication Reconciliation Discrepancies:  No aspirin on file. Dr. Zen Head note 1/4/23 states DAPT with Brilinta and aspirin    Medications Identified with a Contraindication or Warning with Heart Failure: none    Heart Failure Medication Adherence: Currently taking their Heart Failure medications as prescribed. Non-Heart Failure Medication Adherence: Currently taking their non-Heart Failure medications as prescribed.     Medication Adverse Reactions Noted: none    Barriers to Medication care: none    Reviewed heart failure medications including how they work and potential side effects  Advised patient to avoid NSAIDs  Counseled on all other medications    Medication Reconciliation comments:   Added to free med program    Further Assessment / Education     Enzo Bustos appears well, in no acute distress, with no significant weight gain, and with chest discomfort from coughing. Comes with no device assistance. Is here today alone for further heart failure education and assessment. Jeff appears ready, willing and able to engage in self-management activities.     Reports cough almost gone and flexeril to help with chest discomfort. Wasn't sleeping well with chest discomfort.     Very engaged in our visit with lots of questions.     Reports his weight has been running 245-248 at home.  247 at home today with weight at I = 255 today.     Potential barriers to overall care:  - economic, Medicaid pending.     Jeff's goals pertaining to heart failure and over all health care:   - Goals not addressed this visit    Education Provided:  Reviewed a Patient Education Guide: Living with Heart Failure  Daily weights:  Identifying weight gain of 3 pounds in a day or 5 pounds in a week  Documenting missed HF medications, extra diuretic doses, increased fluid intake, increased sodium intake, travel, symptoms, etc.   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 Santillan today and identified any concerns.      Assessed need for follow up including but not limited to Dietitian, Spiritual Care, , Diabetes, COPD, Financial Assistance, Cardiac Rehab, Pulmonary Rehab, Smoking Cessation, Sleep Medicine, Palliative Care.     Discussed consequences of non adherence to health care plan.     Further Assessment / Plan     1. Chronic combined systolic and diastolic heart failure (HCC)      Heart Failure  Medications as prescribed:    Daily weights  Call with weight gain of 3 pounds per day or 5 pounds per week  Call with weight above dry weight  Limit sodium to 2000mg per day and limit fluids to 2000mL per day   Call right away with si/sx of heart failure  Avoid NSAIDs  Activity goal of 30 minutes 5 times a week  Heart Healthy Eating Patterns  Remain up to date with vaccinations  Avoid Alcohol   Smoking Cessation Smoking Cessation Plan:    Non-smoker  Follow Cardiology instructions. Schedule sleep study (hx of snoring)  Follow up with health care providers. Providers that need to be scheduled: None identified  Co-morbidities identified affecting Heart Failure:        - CAD  - HTN  - HLD  - Obesity  Counseled on the above co-morbidities including but not limited to:        BP control, HLD control, DM control, Heart healthy eating patterns, Low sodium eating patterns, Physical activity of 150 minutes per week, Taking medications as prescribed, Avoid NSAIDs, and Weight management    Action taken today:  Thorough Medication Review as noted above  Education and Reinforcement as noted above  Added to the Free Heart Failure Med Program with Jackie Ortiz w I to see if he s/b taking aspirin as per cardiology note 1/4/23    No orders of the defined types were placed in this encounter. No orders of the defined types were placed in this encounter. Plan:  [facility] Follow up: 3 weeks  Will check with Dr. Alison Balderas to see if he s/b on aspirin. Will follow along to recommend SGLT2I if not added going forward.      Communication to other providers:  Zazoo message sent to [address] addressing the following: if he s/b taking aspirin as noted in cardiology OV 1/4/23 due to recent PCI w COMPA 12/19/22        Electronically signed by Martell Harris PharmD on 1/14/2023 at 5:09 PM     CLINICAL PHARMACY CONSULT: MED RECONCILIATION/REVIEW [address] Only    Program: Medication Management  CPA in place:  Yes  Recommendation Provided To: Provider: 2 via Note to Provider and Patient/Caregiver: 2 via In person  Intervention Detail: New Rx: 1, reason: Needs Additional Therapy and Patient Access Assistance/Sample Provided  Gap Closed?: Yes   Intervention Accepted By: Provider: 1 and Patient/Caregiver: 1  Time Spent (min): 75

## 2023-01-23 ENCOUNTER — TELEPHONE (OUTPATIENT)
Dept: PHARMACY | Age: 63
End: 2023-01-23

## 2023-01-23 NOTE — TELEPHONE ENCOUNTER
Spoke w Fernando Valencia to let him know to take an aspirin 81mg daily post stent. Lifelong per cardiology instructions. He agrees. Reports he could not get free meds from retail pharmacy. I will call tomorrow to check status.      Yohana Julian, PharmD  [facility]  Heart Failure Service  [phone]    For Pharmacy Admin Tracking Only    Program: Medication Management  CPA in place:  Yes  Recommendation Provided To: Provider: 1 via Note to Provider  Intervention Detail: New Rx: 1, reason: Needs Additional Therapy  Intervention Accepted By: Provider: 1  Gap Closed?: Yes   Time Spent (min): 15

## 2023-01-26 NOTE — TELEPHONE ENCOUNTER
Medication Samples    Medication:  ticagrelor (BRILINTA)     Dosage of the medication:  90 MG TABS tablet     How are you taking this medication (QD, BID, TID, QID, PRN):  Take 1 tablet by mouth 2 times daily     in the office or Mail to your home? Per Janina Green at the [address] [place] [address] he will be by tomorrow or early next week to pick them up.

## 2023-01-30 ENCOUNTER — TELEPHONE (OUTPATIENT)
Dept: PHARMACY | Age: 63
End: 2023-01-30

## 2023-01-30 NOTE — TELEPHONE ENCOUNTER
Reached out to Ade Rod to let him know his heart medications should be available at [facility] at no charge through our free medication program.     Aspirin is also available at [facility]. Samples are available at [facility] for the [address] [place] [address]. \"Unable to accept calls\" at his mobile number. No VM at his home number. Will continue to follow along.      Yuli Flores, PharmD  [facility]  Heart Failure Service  [phone]

## 2023-03-29 ENCOUNTER — OFFICE VISIT (OUTPATIENT)
Dept: CARDIOLOGY CLINIC | Age: 63
End: 2023-03-29
Payer: MEDICAID

## 2023-03-29 VITALS
RESPIRATION RATE: 16 BRPM | OXYGEN SATURATION: 96 % | HEIGHT: 70 IN | WEIGHT: 266 LBS | BODY MASS INDEX: 38.08 KG/M2 | DIASTOLIC BLOOD PRESSURE: 82 MMHG | HEART RATE: 91 BPM | SYSTOLIC BLOOD PRESSURE: 128 MMHG

## 2023-03-29 DIAGNOSIS — F19.10 POLYSUBSTANCE ABUSE (HCC): ICD-10-CM

## 2023-03-29 DIAGNOSIS — I25.10 CAD IN NATIVE ARTERY: Primary | ICD-10-CM

## 2023-03-29 DIAGNOSIS — R06.83 SNORING: ICD-10-CM

## 2023-03-29 DIAGNOSIS — I50.22 CHRONIC SYSTOLIC HEART FAILURE (HCC): ICD-10-CM

## 2023-03-29 DIAGNOSIS — I10 ESSENTIAL HYPERTENSION: ICD-10-CM

## 2023-03-29 PROCEDURE — 3078F DIAST BP <80 MM HG: CPT | Performed by: STUDENT IN AN ORGANIZED HEALTH CARE EDUCATION/TRAINING PROGRAM

## 2023-03-29 PROCEDURE — 99214 OFFICE O/P EST MOD 30 MIN: CPT | Performed by: STUDENT IN AN ORGANIZED HEALTH CARE EDUCATION/TRAINING PROGRAM

## 2023-03-29 PROCEDURE — 3074F SYST BP LT 130 MM HG: CPT | Performed by: STUDENT IN AN ORGANIZED HEALTH CARE EDUCATION/TRAINING PROGRAM

## 2023-03-29 RX ORDER — CARVEDILOL 6.25 MG/1
6.25 TABLET ORAL 2 TIMES DAILY
Qty: 60 TABLET | Refills: 5 | Status: SHIPPED | OUTPATIENT
Start: 2023-03-29

## 2023-03-29 RX ORDER — VALSARTAN 40 MG/1
40 TABLET ORAL DAILY
Qty: 30 TABLET | Refills: 5 | Status: SHIPPED | OUTPATIENT
Start: 2023-03-29

## 2023-03-29 RX ORDER — FUROSEMIDE 40 MG/1
40 TABLET ORAL DAILY
Qty: 60 TABLET | Refills: 5 | Status: SHIPPED | OUTPATIENT
Start: 2023-03-29

## 2023-03-29 RX ORDER — SPIRONOLACTONE 25 MG/1
25 TABLET ORAL DAILY
Qty: 30 TABLET | Refills: 5 | Status: SHIPPED | OUTPATIENT
Start: 2023-03-29

## 2023-03-29 NOTE — PROGRESS NOTES
and carvedilol. Echo scheduled for 3/31/2023. GXT for DOT examination. Essential hypertension. Goal <130/80. BP well controlled on today's visit. Continue risk factor modifications, current medical therapy. Polysubstance abuse. Patient admits cessation. Return to the office in 6 months       Thank you very much for allowing me to participate in the care of your patient. Please do not hesitate to contact me if you have any questions. Sincerely,    Cristy Roche MD  Interventional Cardiology    [facility], [address] [place] [address] Sveta   [facility] [address]  Ph: [phone]  Fax: [phone]    This note was scribed in the presence of Dr. Odalis Valle MD by Binh Patel RN    Physician Attestation:  The scribes documentation has been prepared under my direction and personally reviewed by me in its entirety. I confirm the note above accurately reflects all work, treatment, procedures, and medical decision making performed by me. Electronically signed by Cristy Roche MD on 4/5/2023 at 5:20 PM       I have personally reviewed all previous testing for this visit today including imaging, lab results and EKG as detailed above and in care everywhere.

## 2023-03-31 ENCOUNTER — HOSPITAL ENCOUNTER (OUTPATIENT)
Dept: CARDIOLOGY | Age: 63
Discharge: HOME | End: 2023-03-31
Payer: MEDICAID

## 2023-03-31 DIAGNOSIS — I50.22 CHRONIC SYSTOLIC HEART FAILURE (HCC): ICD-10-CM

## 2023-03-31 DIAGNOSIS — I25.5 ISCHEMIC CARDIOMYOPATHY: ICD-10-CM

## 2023-03-31 DIAGNOSIS — I10 ESSENTIAL HYPERTENSION: ICD-10-CM

## 2023-03-31 DIAGNOSIS — F19.10 POLYSUBSTANCE ABUSE (HCC): ICD-10-CM

## 2023-03-31 DIAGNOSIS — R06.83 SNORING: ICD-10-CM

## 2023-03-31 DIAGNOSIS — I25.10 CAD IN NATIVE ARTERY: ICD-10-CM

## 2023-03-31 LAB
LV EF: 38 %
LVEF MODALITY: NORMAL

## 2023-03-31 PROCEDURE — 93306 TTE W/DOPPLER COMPLETE: CPT

## 2023-04-06 ENCOUNTER — HOSPITAL ENCOUNTER (OUTPATIENT)
Dept: NON INVASIVE DIAGNOSTICS | Age: 63
Discharge: HOME | End: 2023-04-06
Payer: MEDICAID

## 2023-04-06 DIAGNOSIS — I50.22 CHRONIC SYSTOLIC HEART FAILURE (HCC): ICD-10-CM

## 2023-04-06 DIAGNOSIS — I25.10 CAD IN NATIVE ARTERY: ICD-10-CM

## 2023-04-06 PROCEDURE — 93017 CV STRESS TEST TRACING ONLY: CPT

## 2023-04-25 ENCOUNTER — TELEPHONE (OUTPATIENT)
Dept: CARDIOLOGY CLINIC | Age: 63
End: 2023-04-25

## 2023-04-25 NOTE — TELEPHONE ENCOUNTER
Patient did not  Brilinta 90 mg, 4 boxes medication samples from I. Brilinta 90 mg, 4 boxes returned to [facility] medication sample inventory.

## (undated) DEVICE — CONTROL SYRINGE LUER-LOCK TIP: Brand: MONOJECT

## (undated) DEVICE — SOLUTION IV IRRIG POUR BRL 0.9% SODIUM CHL 2F7124

## (undated) DEVICE — NEEDLE HYPO 22GA L1.5IN BLK POLYPR HUB S STL REG BVL STR

## (undated) DEVICE — MAJOR SET UP PK

## (undated) DEVICE — GOWN,SIRUS,POLYRNF,BRTHSLV,XL,30/CS: Brand: MEDLINE

## (undated) DEVICE — CHLORAPREP 26ML ORANGE

## (undated) DEVICE — BLADE ES ELASTOMERIC COAT INSUL DURABLE BEND UPTO 90DEG

## (undated) DEVICE — ADHESIVE SKIN CLSR 0.7ML TOP DERMBND ADV

## (undated) DEVICE — INTENDED FOR TISSUE SEPARATION, AND OTHER PROCEDURES THAT REQUIRE A SHARP SURGICAL BLADE TO PUNCTURE OR CUT.: Brand: BARD-PARKER ® STAINLESS STEEL BLADES

## (undated) DEVICE — STERILE POLYISOPRENE POWDER-FREE SURGICAL GLOVES: Brand: PROTEXIS

## (undated) DEVICE — DRAPE ADOLESCENT  LAPAROTOMY

## (undated) DEVICE — BLADE CLIPPER GEN PURP NS